# Patient Record
Sex: MALE | Race: WHITE | NOT HISPANIC OR LATINO | URBAN - METROPOLITAN AREA
[De-identification: names, ages, dates, MRNs, and addresses within clinical notes are randomized per-mention and may not be internally consistent; named-entity substitution may affect disease eponyms.]

---

## 2020-07-25 ENCOUNTER — TELEPHONE ENCOUNTER (OUTPATIENT)
Dept: URBAN - METROPOLITAN AREA CLINIC 13 | Facility: CLINIC | Age: 45
End: 2020-07-25

## 2020-07-25 RX ORDER — CETIRIZINE HYDROCHLORIDE 10 MG/1
TAKE 1 TABLET EVERY MORNING AS NEEDED TABLET, FILM COATED ORAL
Refills: 0 | OUTPATIENT
Start: 2009-02-02 | End: 2012-02-16

## 2020-07-26 ENCOUNTER — TELEPHONE ENCOUNTER (OUTPATIENT)
Dept: URBAN - METROPOLITAN AREA CLINIC 13 | Facility: CLINIC | Age: 45
End: 2020-07-26

## 2020-07-26 RX ORDER — MELOXICAM 15 MG/1
TABLET ORAL
Qty: 30 | Refills: 0 | Status: ACTIVE | COMMUNITY
Start: 2011-05-12

## 2020-07-26 RX ORDER — ALBUTEROL SULFATE 90 UG/1
AEROSOL, METERED RESPIRATORY (INHALATION)
Qty: 8 | Refills: 0 | Status: ACTIVE | COMMUNITY
Start: 2011-04-29

## 2020-07-26 RX ORDER — AZITHROMYCIN DIHYDRATE 250 MG/1
TABLET, FILM COATED ORAL
Qty: 6 | Refills: 0 | Status: ACTIVE | COMMUNITY
Start: 2011-03-28

## 2020-07-26 RX ORDER — AMOXICILLIN 875 MG/1
TABLET, FILM COATED ORAL
Qty: 20 | Refills: 0 | Status: ACTIVE | COMMUNITY
Start: 2011-06-04

## 2020-07-26 RX ORDER — HYDROCHLOROTHIAZIDE 12.5 MG/1
TABLET ORAL
Qty: 30 | Refills: 0 | Status: ACTIVE | COMMUNITY
Start: 2011-06-24

## 2023-01-13 ENCOUNTER — LAB VISIT (OUTPATIENT)
Dept: LAB | Facility: HOSPITAL | Age: 48
End: 2023-01-13
Payer: COMMERCIAL

## 2023-01-13 ENCOUNTER — OFFICE VISIT (OUTPATIENT)
Dept: PRIMARY CARE CLINIC | Facility: CLINIC | Age: 48
End: 2023-01-13
Payer: COMMERCIAL

## 2023-01-13 VITALS
BODY MASS INDEX: 30.27 KG/M2 | HEART RATE: 66 BPM | OXYGEN SATURATION: 96 % | WEIGHT: 181.69 LBS | TEMPERATURE: 98 F | SYSTOLIC BLOOD PRESSURE: 120 MMHG | RESPIRATION RATE: 15 BRPM | DIASTOLIC BLOOD PRESSURE: 80 MMHG | HEIGHT: 65 IN

## 2023-01-13 DIAGNOSIS — Z12.5 ENCOUNTER FOR SCREENING FOR MALIGNANT NEOPLASM OF PROSTATE: ICD-10-CM

## 2023-01-13 DIAGNOSIS — Z76.89 ENCOUNTER TO ESTABLISH CARE: ICD-10-CM

## 2023-01-13 DIAGNOSIS — J00 ACUTE RHINITIS: ICD-10-CM

## 2023-01-13 DIAGNOSIS — I10 PRIMARY HYPERTENSION: ICD-10-CM

## 2023-01-13 DIAGNOSIS — R79.89 LOW TESTOSTERONE IN MALE: ICD-10-CM

## 2023-01-13 DIAGNOSIS — Z12.11 COLON CANCER SCREENING: ICD-10-CM

## 2023-01-13 DIAGNOSIS — Z11.4 ENCOUNTER FOR SCREENING FOR HIV: ICD-10-CM

## 2023-01-13 DIAGNOSIS — Z76.89 ENCOUNTER TO ESTABLISH CARE: Primary | ICD-10-CM

## 2023-01-13 DIAGNOSIS — M79.673 PAIN OF FOOT, UNSPECIFIED LATERALITY: ICD-10-CM

## 2023-01-13 DIAGNOSIS — Z11.59 ENCOUNTER FOR HCV SCREENING TEST FOR LOW RISK PATIENT: ICD-10-CM

## 2023-01-13 DIAGNOSIS — F90.9 ATTENTION DEFICIT HYPERACTIVITY DISORDER (ADHD), UNSPECIFIED ADHD TYPE: ICD-10-CM

## 2023-01-13 DIAGNOSIS — J45.41 MODERATE PERSISTENT ASTHMA WITH ACUTE EXACERBATION: ICD-10-CM

## 2023-01-13 PROCEDURE — 1159F PR MEDICATION LIST DOCUMENTED IN MEDICAL RECORD: ICD-10-PCS | Mod: CPTII,S$GLB,, | Performed by: FAMILY MEDICINE

## 2023-01-13 PROCEDURE — 96372 PR INJECTION,THERAP/PROPH/DIAG2ST, IM OR SUBCUT: ICD-10-PCS | Mod: S$GLB,,, | Performed by: FAMILY MEDICINE

## 2023-01-13 PROCEDURE — 86803 HEPATITIS C AB TEST: CPT | Performed by: FAMILY MEDICINE

## 2023-01-13 PROCEDURE — 80061 LIPID PANEL: CPT | Performed by: FAMILY MEDICINE

## 2023-01-13 PROCEDURE — 99205 PR OFFICE/OUTPT VISIT, NEW, LEVL V, 60-74 MIN: ICD-10-PCS | Mod: 25,S$GLB,, | Performed by: FAMILY MEDICINE

## 2023-01-13 PROCEDURE — 99205 OFFICE O/P NEW HI 60 MIN: CPT | Mod: 25,S$GLB,, | Performed by: FAMILY MEDICINE

## 2023-01-13 PROCEDURE — 1159F MED LIST DOCD IN RCRD: CPT | Mod: CPTII,S$GLB,, | Performed by: FAMILY MEDICINE

## 2023-01-13 PROCEDURE — 3074F PR MOST RECENT SYSTOLIC BLOOD PRESSURE < 130 MM HG: ICD-10-PCS | Mod: CPTII,S$GLB,, | Performed by: FAMILY MEDICINE

## 2023-01-13 PROCEDURE — 85025 COMPLETE CBC W/AUTO DIFF WBC: CPT | Performed by: FAMILY MEDICINE

## 2023-01-13 PROCEDURE — 3079F PR MOST RECENT DIASTOLIC BLOOD PRESSURE 80-89 MM HG: ICD-10-PCS | Mod: CPTII,S$GLB,, | Performed by: FAMILY MEDICINE

## 2023-01-13 PROCEDURE — 1160F PR REVIEW ALL MEDS BY PRESCRIBER/CLIN PHARMACIST DOCUMENTED: ICD-10-PCS | Mod: CPTII,S$GLB,, | Performed by: FAMILY MEDICINE

## 2023-01-13 PROCEDURE — 84443 ASSAY THYROID STIM HORMONE: CPT | Performed by: FAMILY MEDICINE

## 2023-01-13 PROCEDURE — 84153 ASSAY OF PSA TOTAL: CPT | Performed by: FAMILY MEDICINE

## 2023-01-13 PROCEDURE — 87389 HIV-1 AG W/HIV-1&-2 AB AG IA: CPT | Performed by: FAMILY MEDICINE

## 2023-01-13 PROCEDURE — 99999 PR PBB SHADOW E&M-NEW PATIENT-LVL V: ICD-10-PCS | Mod: PBBFAC,,, | Performed by: FAMILY MEDICINE

## 2023-01-13 PROCEDURE — 96372 THER/PROPH/DIAG INJ SC/IM: CPT | Mod: S$GLB,,, | Performed by: FAMILY MEDICINE

## 2023-01-13 PROCEDURE — 3008F PR BODY MASS INDEX (BMI) DOCUMENTED: ICD-10-PCS | Mod: CPTII,S$GLB,, | Performed by: FAMILY MEDICINE

## 2023-01-13 PROCEDURE — 1160F RVW MEDS BY RX/DR IN RCRD: CPT | Mod: CPTII,S$GLB,, | Performed by: FAMILY MEDICINE

## 2023-01-13 PROCEDURE — 3008F BODY MASS INDEX DOCD: CPT | Mod: CPTII,S$GLB,, | Performed by: FAMILY MEDICINE

## 2023-01-13 PROCEDURE — 99999 PR PBB SHADOW E&M-NEW PATIENT-LVL V: CPT | Mod: PBBFAC,,, | Performed by: FAMILY MEDICINE

## 2023-01-13 PROCEDURE — 36415 COLL VENOUS BLD VENIPUNCTURE: CPT | Mod: PN | Performed by: FAMILY MEDICINE

## 2023-01-13 PROCEDURE — 3079F DIAST BP 80-89 MM HG: CPT | Mod: CPTII,S$GLB,, | Performed by: FAMILY MEDICINE

## 2023-01-13 PROCEDURE — 3074F SYST BP LT 130 MM HG: CPT | Mod: CPTII,S$GLB,, | Performed by: FAMILY MEDICINE

## 2023-01-13 PROCEDURE — 80053 COMPREHEN METABOLIC PANEL: CPT | Performed by: FAMILY MEDICINE

## 2023-01-13 RX ORDER — PREDNISONE 20 MG/1
40 TABLET ORAL DAILY
Qty: 10 TABLET | Refills: 0 | Status: SHIPPED | OUTPATIENT
Start: 2023-01-13 | End: 2023-01-18

## 2023-01-13 RX ORDER — BETAMETHASONE SODIUM PHOSPHATE AND BETAMETHASONE ACETATE 3; 3 MG/ML; MG/ML
9 INJECTION, SUSPENSION INTRA-ARTICULAR; INTRALESIONAL; INTRAMUSCULAR; SOFT TISSUE
Status: COMPLETED | OUTPATIENT
Start: 2023-01-13 | End: 2023-01-13

## 2023-01-13 RX ORDER — LOSARTAN POTASSIUM AND HYDROCHLOROTHIAZIDE 25; 100 MG/1; MG/1
1 TABLET ORAL DAILY
Qty: 90 TABLET | Refills: 3 | Status: SHIPPED | OUTPATIENT
Start: 2023-01-13 | End: 2024-01-13

## 2023-01-13 RX ORDER — TESTOSTERONE ENANTHATE 75 MG/.5ML
INJECTION SUBCUTANEOUS
COMMUNITY
End: 2023-01-13 | Stop reason: SDUPTHER

## 2023-01-13 RX ORDER — OMEPRAZOLE 40 MG/1
CAPSULE, DELAYED RELEASE ORAL
COMMUNITY
End: 2023-01-13 | Stop reason: ALTCHOICE

## 2023-01-13 RX ORDER — DEXTROAMPHETAMINE SACCHARATE, AMPHETAMINE ASPARTATE, DEXTROAMPHETAMINE SULFATE AND AMPHETAMINE SULFATE 2.5; 2.5; 2.5; 2.5 MG/1; MG/1; MG/1; MG/1
TABLET ORAL
COMMUNITY
End: 2023-02-15 | Stop reason: SDUPTHER

## 2023-01-13 RX ORDER — LOSARTAN POTASSIUM 100 MG/1
TABLET ORAL
COMMUNITY
End: 2023-01-13 | Stop reason: ALTCHOICE

## 2023-01-13 RX ORDER — MECLIZINE HYDROCHLORIDE 25 MG/1
TABLET ORAL
COMMUNITY
End: 2023-01-13 | Stop reason: ALTCHOICE

## 2023-01-13 RX ORDER — TESTOSTERONE ENANTHATE 75 MG/.5ML
75 INJECTION SUBCUTANEOUS WEEKLY
Qty: 3 ML | Refills: 0 | Status: SHIPPED | OUTPATIENT
Start: 2023-01-13 | End: 2023-01-31 | Stop reason: SDUPTHER

## 2023-01-13 RX ORDER — ALBUTEROL SULFATE 0.83 MG/ML
SOLUTION RESPIRATORY (INHALATION)
COMMUNITY
End: 2023-01-13 | Stop reason: SDUPTHER

## 2023-01-13 RX ORDER — DUPILUMAB 300 MG/2ML
2 INJECTION, SOLUTION SUBCUTANEOUS
COMMUNITY
End: 2023-01-13 | Stop reason: SDUPTHER

## 2023-01-13 RX ORDER — DUPILUMAB 300 MG/2ML
300 INJECTION, SOLUTION SUBCUTANEOUS
Qty: 12 ML | Refills: 3 | Status: SHIPPED | OUTPATIENT
Start: 2023-01-13 | End: 2023-02-07 | Stop reason: SDUPTHER

## 2023-01-13 RX ORDER — HYDROCHLOROTHIAZIDE 25 MG/1
TABLET ORAL
COMMUNITY
End: 2023-01-13 | Stop reason: ALTCHOICE

## 2023-01-13 RX ORDER — ALBUTEROL SULFATE 90 UG/1
2 AEROSOL, METERED RESPIRATORY (INHALATION) EVERY 6 HOURS PRN
Qty: 18 G | Refills: 3 | Status: SHIPPED | OUTPATIENT
Start: 2023-01-13

## 2023-01-13 RX ORDER — ALBUTEROL SULFATE 90 UG/1
AEROSOL, METERED RESPIRATORY (INHALATION)
COMMUNITY
End: 2023-01-13 | Stop reason: SDUPTHER

## 2023-01-13 RX ADMIN — BETAMETHASONE SODIUM PHOSPHATE AND BETAMETHASONE ACETATE 9 MG: 3; 3 INJECTION, SUSPENSION INTRA-ARTICULAR; INTRALESIONAL; INTRAMUSCULAR; SOFT TISSUE at 12:01

## 2023-01-13 NOTE — PROGRESS NOTES
After obtaining consent, per orders from Dr Ortiz, celestone 9mg injection given by myself. Pt instructed to wait 15 min afterwards and to report any adverse reaction/feelings immediately.    Pt declined to wait stating he has had 'numerous steroid injections' in the past and he had to get back to work.

## 2023-01-13 NOTE — PATIENT INSTRUCTIONS
Physically, everything looks pretty good today.      Screening labs done today.  [facility] will [facility] as soon as they are available.      Most of your medication refills have been sent to the pharmacy today.  Please continue to take them, as directed.      One month supply of testosterone sent to pharmacy today.  Our clinic does not typically maintained testosterone treatments.  We defer that to our urologist.  Referral placed to Urology today.  We have a urologist that comes here, to this location, three days per week.      Your blood pressure medications, losartan and hydrochlorothiazide have been combined into a single tablet.  This should make it easier to continue taking them.      Asthma medications have been refilled.  Continue taking them, as directed.  Would like you to check in with one of our pulmonologists, as well.  Referral placed today.    For the ADHD medications, we will need to get verification of the diagnosis from your previous provider.  We will contact them today.  As soon as we get the records, we can send the prescription to the pharmacy.    For your allergy flare up, let us do Preeti on injection today.  Start prednisone tablets tomorrow.  Continue taking OTC antihistamine, such as Claritin or Zyrtec.  Down the road, we can always add Singulair at bedtime to see if that helps both with your allergies and with your asthma.    We are due for colon cancer screening.  Referral placed to the endoscopy  today.  They should contact you to arrange a day and time that is convenient.  If for some reason it does not work with your schedule, please let me know.  We can always do the Cologuard test instead.    Referral also placed for Dr. Kenya Bonilla, orthopedic foot specialist, with [facility].  Feel free to call her office at [phone] to schedule an appointment.    Continue to eat a healthy diet.  Be careful with portion sizes.  Includes lots of fresh fruits,  vegetables, whole grains, lean proteins.  See info below.    Keep hydrated.  Be sure to drink at least 8-10, 8 oz, glasses of water every day.    Stay active.  Try to do some sort of physical activity every day.  Nothing outrageous, just try walking for 10-15 minutes each day.

## 2023-01-13 NOTE — PROGRESS NOTES
"    [facility]       [address] [place] [address]      Phone: [phone]      Fax: [phone]    Slim Ortiz MD                Office Visit  01/13/2023        Subjective      HPI:  Fernando Hoff is a 48 y.o. male presents today in clinic for "Establish Care  ."     48-year-old gentleman presents today to [facility] care, checkup.      Patient states he just moved here from [place].  Needs medication refills.      States that he feels generally okay today.  Has been having some postnasal drip, coughing, sneezing lately.  Throat feels irritated.  No fever, chills.  Has been using OTC Claritin, Benadryl, Chloraseptic.    Also reports that over the past few days, around mid day, his hands feel like there cramping up.  Thinks he might be a little dehydrated.  Has been eating bananas to see if that would help.      Otherwise, he denies any chest pain, shortness a breath.  States appetite is normal.  States bowel movements are normal.  No urinary issues.      Has hypertension.  Has been taking losartan 100 mg, HCTZ 25 mg daily.  Blood pressure generally well controlled on these medications.      Also has moderate persistent asthma.  Currently on Dupixent injections, 300 mg every two weeks.  Uses Trelegy inhaler daily.  Has albuterol inhaler to use as needed.  Has been using it a little more frequently lately.  Has needed it about two or 3 times in the last couple of weeks, mostly due to the above symptoms.    Also reports ADHD.  Went through full evaluation back in [place].  Has prescription for Adderall 10 mg, twice a day.  States he generally only takes it once a day.    Has been diagnosed with low testosterone in the past.  Takes Xyosted injections 75 mg weekly.    PMH:  HTN.  Asthma.  Allergies.  ADHD.  Low T.    PSH: Left shoulder x2.  Left ankle/foot.  Nasal turbinates.  Deviated septum.    F MH: CAD/CABG.  HTN.  Thyroid.  Unknown cancer.    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    T: Denies   A:  Occasionally  D: Denies    Exercise:  Goes to the gym daily.      Colon:  Had one in the past secondary to blood in his stools.      The following were updated and reviewed by myself in the chart: medications, past medical history, past surgical history, family history, social history, and allergies.     Medications:  Current Outpatient Medications on File Prior to Visit   Medication Sig Dispense Refill    dextroamphetamine-amphetamine 10 mg Tab dextroamphetamine-amphetamine 10 mg tablet   TAKE 1 TABLET BY MOUTH TWICE DAILY FOR 30 DAYS      [DISCONTINUED] albuterol (PROVENTIL) 2.5 mg /3 mL (0.083 %) nebulizer solution albuterol sulfate 2.5 mg/3 mL (0.083 %) solution for nebulization   USE 1 VIAL VIA NEBULIZER FOUR TIMES DAILY AS NEEDED FOR WHEEZING      [DISCONTINUED] albuterol (PROVENTIL/VENTOLIN HFA) 90 mcg/actuation inhaler albuterol sulfate HFA 90 mcg/actuation aerosol inhaler   Inhale 2 puffs every 4 hours by inhalation route as needed for 30 days.      [DISCONTINUED] dupilumab (DUPIXENT SYRINGE) 300 mg/2 mL Syrg 2 mLs.      [DISCONTINUED] fluticasone-umeclidin-vilanter (TRELEGY ELLIPTA) 100-62.5-25 mcg DsDv Trelegy Ellipta 100 mcg-62.5 mcg-25 mcg powder for inhalation   Inhale 1 puff every day by inhalation route.      [DISCONTINUED] hydroCHLOROthiazide (HYDRODIURIL) 25 MG tablet hydrochlorothiazide 25 mg tablet   TAKE 1 TABLET BY MOUTH DAILY      [DISCONTINUED] losartan (COZAAR) 100 MG tablet losartan 100 mg tablet   TAKE 1 TABLET BY MOUTH EVERY DAY      [DISCONTINUED] meclizine (ANTIVERT) 25 mg tablet meclizine 25 mg tablet   TK 1 T PO  BID PRF VERTIGO      [DISCONTINUED] omeprazole (PRILOSEC) 40 MG capsule omeprazole 40 mg capsule,delayed release   TAKE 1 CAPSULE BY MOUTH EVERY DAY      [DISCONTINUED] testosterone enanthate (XYOSTED) 75 mg/0.5 mL AtIn Xyosted 75 mg/0.5 mL subcutaneous auto-injector   INJECT 1 PEN UNDER  "THE SKIN ONCE A WEEK       No current facility-administered medications on file prior to visit.        PMHx:  Past Medical History:   Diagnosis Date    Hypertension       There are no problems to display for this patient.       PSHx:  Past Surgical History:   Procedure Laterality Date    ANKLE SURGERY Left 2020 2021    FOOT SURGERY Left 2020 2021    NASAL SEPTUM SURGERY  2012    SHOULDER SURGERY Right 2018        FHx:  Family History   Problem Relation Age of Onset    Hypertension Mother     Thyroid disease Mother     Heart disease Father     Hypertension Father     Allergies Father         Social:  Social History     Socioeconomic History    Marital status: Single   Tobacco Use    Smoking status: Never    Smokeless tobacco: Never   Substance and Sexual Activity    Alcohol use: Yes     Comment: on ooccasion    Drug use: Never    Sexual activity: Yes     Partners: Female     Birth control/protection: OCP        Allergies:  Review of patient's allergies indicates:  No Known Allergies     ROS:  Review of Systems   Constitutional:  Negative for activity change, appetite change, chills and fever.   HENT:  Positive for congestion, postnasal drip, rhinorrhea, sneezing and sore throat. Negative for trouble swallowing.    Respiratory:  Negative for cough and shortness of breath.    Cardiovascular:  Negative for chest pain and palpitations.   Gastrointestinal:  Negative for abdominal pain, constipation, diarrhea, nausea and vomiting.   Genitourinary:  Negative for difficulty urinating.   Musculoskeletal:  Negative for arthralgias and myalgias.   Skin:  Negative for color change and rash.   Neurological:  Negative for headaches.   All other systems reviewed and are negative.       Objective      /80   Pulse 66   Temp 98.4 °F (36.9 °C) (Temporal)   Resp 15   Ht 5' 5" (1.651 m)   Wt 82.4 kg (181 lb 10.5 oz)   SpO2 96%   BMI 30.23 kg/m²   Ht Readings from Last 3 Encounters:   01/13/23 5' 5" (1.651 m)     Wt " Readings from Last 3 Encounters:   01/13/23 82.4 kg (181 lb 10.5 oz)       PHYSICAL EXAM:  Physical Exam  Vitals and nursing note reviewed.   Constitutional:       General: He is not in acute distress.     Appearance: Normal appearance.   HENT:      Head: Normocephalic and atraumatic.      Right Ear: Tympanic membrane, ear canal and external ear normal.      Left Ear: Tympanic membrane, ear canal and external ear normal.      Nose: Congestion present. No rhinorrhea.      Mouth/Throat:      Mouth: Mucous membranes are moist.      Pharynx: Oropharynx is clear. No oropharyngeal exudate or posterior oropharyngeal erythema.   Eyes:      Extraocular Movements: Extraocular movements intact.      Conjunctiva/sclera: Conjunctivae normal.      Pupils: Pupils are equal, round, and reactive to light.   Cardiovascular:      Rate and Rhythm: Normal rate and regular rhythm.   Pulmonary:      Effort: Pulmonary effort is normal.      Breath sounds: No wheezing, rhonchi or rales.   Musculoskeletal:         General: Normal range of motion.      Cervical back: Normal range of motion.   Lymphadenopathy:      Cervical: No cervical adenopathy.   Skin:     General: Skin is warm and dry.   Neurological:      General: No focal deficit present.      Mental Status: He is alert.            LABS / IMAGING:  No results found for this or any previous visit (from the past 4368 hour(s)).      Assessment    1. Encounter to establish care    2. Primary hypertension    3. Mild persistent asthma without complication    4. Acute rhinitis    5. Low testosterone in male    6. Attention deficit hyperactivity disorder (ADHD), unspecified ADHD type    7. Colon cancer screening    8. Encounter for HCV screening test for low risk patient    9. Encounter for screening for HIV    10. Encounter for screening for malignant neoplasm of prostate    11. Pain of foot, unspecified laterality          Plan    Fernando was seen today for establish care.    Diagnoses and all  orders for this visit:    Encounter to establish care  -     CBC Auto Differential; Future  -     Comprehensive Metabolic Panel; Future  -     TSH; Future  -     Lipid Panel; Future  -     PSA, Screening; Future  -     HIV 1/2 Ag/Ab (4th Gen); Future  -     Hepatitis C Antibody; Future    Primary hypertension  -     CBC Auto Differential; Future  -     Comprehensive Metabolic Panel; Future  -     TSH; Future  -     Lipid Panel; Future  -     losartan-hydrochlorothiazide 100-25 mg (HYZAAR) 100-25 mg per tablet; Take 1 tablet by mouth once daily.    Mild persistent asthma without complication  -     albuterol (VENTOLIN HFA) 90 mcg/actuation inhaler; Inhale 2 puffs into the lungs every 6 (six) hours as needed for Wheezing or Shortness of Breath. Rescue inhaler.  -     dupilumab (DUPIXENT SYRINGE) 300 mg/2 mL Syrg; Inject 2 mLs (300 mg total) into the skin every 14 (fourteen) days.  -     fluticasone-umeclidin-vilanter (TRELEGY ELLIPTA) 100-62.5-25 mcg DsDv; Inhale 1 puff into the lungs once daily.  -     predniSONE (DELTASONE) 20 MG tablet; Take 2 tablets (40 mg total) by mouth once daily. for 5 days  -     betamethasone acetate-betamethasone sodium phosphate injection 9 mg  -     Ambulatory referral/consult to Pulmonology; Future    Acute rhinitis  -     predniSONE (DELTASONE) 20 MG tablet; Take 2 tablets (40 mg total) by mouth once daily. for 5 days  -     betamethasone acetate-betamethasone sodium phosphate injection 9 mg    Low testosterone in male  -     testosterone enanthate (XYOSTED) 75 mg/0.5 mL AtIn; Inject 75 mg into the skin once a week.  -     Ambulatory referral/consult to Urology; Future    Attention deficit hyperactivity disorder (ADHD), unspecified ADHD type    Colon cancer screening  -     Ambulatory referral/consult to Endo Procedure ; Future    Encounter for HCV screening test for low risk patient  -     Hepatitis C Antibody; Future    Encounter for screening for HIV  -     HIV 1/2 Ag/Ab  (4th Gen); Future    Encounter for screening for malignant neoplasm of prostate  -     PSA, Screening; Future    Pain of foot, unspecified laterality  -     Ambulatory referral/consult to Orthopedics; Future    Physically, everything looks pretty good today.  Has some mild congestion, likely from allergies.  This 1st may be causing his asthma to flare.  Will give Preeti on injection today, prednisone tablets.  Use OTC antihistamines.      Screening labs, as above.      Due for screening colonoscopy.  Referral placed today.    Med refills given today.      Referral to Urology to continue testosterone replacement.      Will send record request to his previous PCP, Dr. Zain Sharma at [facility] in [place], [phone].  Attempted to call their office this afternoon, but Dr. Sharma is not in clinic today.    Will also place referral to pulmonology to monitor Dupixent injections. (May need to send to allergy?)    He requested referral to orthopedic foot specialist for follow-up of ongoing foot issues.  [facility]        FOLLOW-UP:  Follow up in about 3 months (around 4/13/2023) for med refill.    I spent a total of 65 minutes face to face and non-face to face on the date of this visit.This includes time preparing to see the patient (eg, review of tests, notes), obtaining and/or reviewing additional history from an independent historian and/or outside medical records, documenting clinical information in the electronic health record, independently interpreting results and/or communicating results to the patient/family/caregiver, or care coordinator.    Signed by:  Slim Ortiz MD

## 2023-01-14 LAB
ALBUMIN SERPL BCP-MCNC: 3.8 G/DL (ref 3.5–5.2)
ALP SERPL-CCNC: 84 U/L (ref 55–135)
ALT SERPL W/O P-5'-P-CCNC: 41 U/L (ref 10–44)
ANION GAP SERPL CALC-SCNC: 13 MMOL/L (ref 8–16)
AST SERPL-CCNC: 31 U/L (ref 10–40)
BASOPHILS # BLD AUTO: 0.06 K/UL (ref 0–0.2)
BASOPHILS NFR BLD: 0.7 % (ref 0–1.9)
BILIRUB SERPL-MCNC: 1 MG/DL (ref 0.1–1)
BUN SERPL-MCNC: 26 MG/DL (ref 6–20)
CALCIUM SERPL-MCNC: 9.3 MG/DL (ref 8.7–10.5)
CHLORIDE SERPL-SCNC: 100 MMOL/L (ref 95–110)
CHOLEST SERPL-MCNC: 199 MG/DL (ref 120–199)
CHOLEST/HDLC SERPL: 2.6 {RATIO} (ref 2–5)
CO2 SERPL-SCNC: 24 MMOL/L (ref 23–29)
COMPLEXED PSA SERPL-MCNC: 0.35 NG/ML (ref 0–4)
CREAT SERPL-MCNC: 0.9 MG/DL (ref 0.5–1.4)
DIFFERENTIAL METHOD: ABNORMAL
EOSINOPHIL # BLD AUTO: 0.1 K/UL (ref 0–0.5)
EOSINOPHIL NFR BLD: 0.8 % (ref 0–8)
ERYTHROCYTE [DISTWIDTH] IN BLOOD BY AUTOMATED COUNT: 13.5 % (ref 11.5–14.5)
EST. GFR  (NO RACE VARIABLE): >60 ML/MIN/1.73 M^2
GLUCOSE SERPL-MCNC: 66 MG/DL (ref 70–110)
HCT VFR BLD AUTO: 47 % (ref 40–54)
HCV AB SERPL QL IA: NORMAL
HDLC SERPL-MCNC: 78 MG/DL (ref 40–75)
HDLC SERPL: 39.2 % (ref 20–50)
HGB BLD-MCNC: 15.3 G/DL (ref 14–18)
HIV 1+2 AB+HIV1 P24 AG SERPL QL IA: NORMAL
IMM GRANULOCYTES # BLD AUTO: 0.17 K/UL (ref 0–0.04)
IMM GRANULOCYTES NFR BLD AUTO: 2 % (ref 0–0.5)
LDLC SERPL CALC-MCNC: 104.2 MG/DL (ref 63–159)
LYMPHOCYTES # BLD AUTO: 1.7 K/UL (ref 1–4.8)
LYMPHOCYTES NFR BLD: 18.9 % (ref 18–48)
MCH RBC QN AUTO: 30.6 PG (ref 27–31)
MCHC RBC AUTO-ENTMCNC: 32.6 G/DL (ref 32–36)
MCV RBC AUTO: 94 FL (ref 82–98)
MONOCYTES # BLD AUTO: 0.7 K/UL (ref 0.3–1)
MONOCYTES NFR BLD: 8 % (ref 4–15)
NEUTROPHILS # BLD AUTO: 6.1 K/UL (ref 1.8–7.7)
NEUTROPHILS NFR BLD: 69.6 % (ref 38–73)
NONHDLC SERPL-MCNC: 121 MG/DL
NRBC BLD-RTO: 0 /100 WBC
PLATELET # BLD AUTO: 406 K/UL (ref 150–450)
PMV BLD AUTO: 9.6 FL (ref 9.2–12.9)
POTASSIUM SERPL-SCNC: 3.5 MMOL/L (ref 3.5–5.1)
PROT SERPL-MCNC: 7.4 G/DL (ref 6–8.4)
RBC # BLD AUTO: 5 M/UL (ref 4.6–6.2)
SODIUM SERPL-SCNC: 137 MMOL/L (ref 136–145)
TRIGL SERPL-MCNC: 84 MG/DL (ref 30–150)
TSH SERPL DL<=0.005 MIU/L-ACNC: 1.59 UIU/ML (ref 0.4–4)
WBC # BLD AUTO: 8.71 K/UL (ref 3.9–12.7)

## 2023-01-14 NOTE — PROGRESS NOTES
** please call and mail **    Mr. King,     Happy belated birthday!     You should be able to see the results of your recent blood work in [facility].  Everything looks good.      Blood counts all look fine.  Electrolytes, kidney function, liver function, and thyroid function are all okay.      You were negative for HIV and hepatitis-C.  Your PSA level was in the normal range.  Cholesterol numbers look good.      Please let us know if you have any questions!

## 2023-01-31 ENCOUNTER — LAB VISIT (OUTPATIENT)
Dept: LAB | Facility: HOSPITAL | Age: 48
End: 2023-01-31
Attending: UROLOGY
Payer: COMMERCIAL

## 2023-01-31 ENCOUNTER — OFFICE VISIT (OUTPATIENT)
Dept: UROLOGY | Facility: CLINIC | Age: 48
End: 2023-01-31
Payer: COMMERCIAL

## 2023-01-31 VITALS
SYSTOLIC BLOOD PRESSURE: 127 MMHG | HEIGHT: 65 IN | DIASTOLIC BLOOD PRESSURE: 79 MMHG | BODY MASS INDEX: 31.59 KG/M2 | WEIGHT: 189.63 LBS | HEART RATE: 73 BPM

## 2023-01-31 DIAGNOSIS — R79.89 LOW TESTOSTERONE IN MALE: ICD-10-CM

## 2023-01-31 PROCEDURE — 3008F BODY MASS INDEX DOCD: CPT | Mod: CPTII,S$GLB,, | Performed by: UROLOGY

## 2023-01-31 PROCEDURE — 3078F PR MOST RECENT DIASTOLIC BLOOD PRESSURE < 80 MM HG: ICD-10-PCS | Mod: CPTII,S$GLB,, | Performed by: UROLOGY

## 2023-01-31 PROCEDURE — 3078F DIAST BP <80 MM HG: CPT | Mod: CPTII,S$GLB,, | Performed by: UROLOGY

## 2023-01-31 PROCEDURE — 1159F MED LIST DOCD IN RCRD: CPT | Mod: CPTII,S$GLB,, | Performed by: UROLOGY

## 2023-01-31 PROCEDURE — 99999 PR PBB SHADOW E&M-EST. PATIENT-LVL IV: CPT | Mod: PBBFAC,,, | Performed by: UROLOGY

## 2023-01-31 PROCEDURE — 99999 PR PBB SHADOW E&M-EST. PATIENT-LVL IV: ICD-10-PCS | Mod: PBBFAC,,, | Performed by: UROLOGY

## 2023-01-31 PROCEDURE — 99204 OFFICE O/P NEW MOD 45 MIN: CPT | Mod: S$GLB,,, | Performed by: UROLOGY

## 2023-01-31 PROCEDURE — 84403 ASSAY OF TOTAL TESTOSTERONE: CPT | Performed by: UROLOGY

## 2023-01-31 PROCEDURE — 36415 COLL VENOUS BLD VENIPUNCTURE: CPT | Mod: PN | Performed by: UROLOGY

## 2023-01-31 PROCEDURE — 3008F PR BODY MASS INDEX (BMI) DOCUMENTED: ICD-10-PCS | Mod: CPTII,S$GLB,, | Performed by: UROLOGY

## 2023-01-31 PROCEDURE — 3074F SYST BP LT 130 MM HG: CPT | Mod: CPTII,S$GLB,, | Performed by: UROLOGY

## 2023-01-31 PROCEDURE — 1159F PR MEDICATION LIST DOCUMENTED IN MEDICAL RECORD: ICD-10-PCS | Mod: CPTII,S$GLB,, | Performed by: UROLOGY

## 2023-01-31 PROCEDURE — 3074F PR MOST RECENT SYSTOLIC BLOOD PRESSURE < 130 MM HG: ICD-10-PCS | Mod: CPTII,S$GLB,, | Performed by: UROLOGY

## 2023-01-31 PROCEDURE — 99204 PR OFFICE/OUTPT VISIT, NEW, LEVL IV, 45-59 MIN: ICD-10-PCS | Mod: S$GLB,,, | Performed by: UROLOGY

## 2023-01-31 RX ORDER — TESTOSTERONE ENANTHATE 75 MG/.5ML
75 INJECTION SUBCUTANEOUS WEEKLY
Qty: 4 EACH | Refills: 5 | Status: SHIPPED | OUTPATIENT
Start: 2023-01-31 | End: 2023-08-08

## 2023-01-31 NOTE — PROGRESS NOTES
Chief Complaint   Patient presents with    Low Testosterone       Referring Provider: Dr. Slim Ortiz     History of Present Illness:   Fernando Hoff is a 48 y.o. male here for evaluation of Low Testosterone    [birth year]-49yo male, here for evaluation of low T. He reports that he has been Xyosted 75mg weekly for about 2 years. He recently moved from [place]. Prior to that, was on IM testosterone and he had too many peaks and valleys in his levels. Prior to testosterone replacement, he had low energy and low energy. Xyosted has seemed to improve symptoms. No bothersome LUTS.       Review of Systems   Constitutional:  Negative for chills and fever.   Respiratory:  Negative for shortness of breath.    Cardiovascular:  Negative for chest pain and palpitations.   Gastrointestinal:  Negative for abdominal pain.   Genitourinary:  Negative for difficulty urinating.   All other systems reviewed and are negative.      Past Medical History:   Diagnosis Date    Hypertension     Low testosterone        Past Surgical History:   Procedure Laterality Date    ANKLE SURGERY Left 2020    2021    FOOT SURGERY Left 2020    2021    NASAL SEPTUM SURGERY  2012    SHOULDER SURGERY Right 2018       Family History   Problem Relation Age of Onset    Hypertension Mother     Thyroid disease Mother     Heart disease Father     Hypertension Father     Allergies Father        Social History     Tobacco Use    Smoking status: Never    Smokeless tobacco: Never   Substance Use Topics    Alcohol use: Yes     Comment: on ooccasion    Drug use: Never       Current Outpatient Medications   Medication Sig Dispense Refill    albuterol (VENTOLIN HFA) 90 mcg/actuation inhaler Inhale 2 puffs into the lungs every 6 (six) hours as needed for Wheezing or Shortness of Breath. Rescue inhaler. 18 g 3    dupilumab (DUPIXENT SYRINGE) 300 mg/2 mL Syrg Inject 2 mLs (300 mg total) into the skin every 14 (fourteen) days. 12 mL 3    fluticasone-umeclidin-vilanter  (TRELEGY ELLIPTA) 100-62.5-25 mcg DsDv Inhale 1 puff into the lungs once daily. 60 each 5    losartan-hydrochlorothiazide 100-25 mg (HYZAAR) 100-25 mg per tablet Take 1 tablet by mouth once daily. 90 tablet 3    dextroamphetamine-amphetamine 10 mg Tab dextroamphetamine-amphetamine 10 mg tablet   TAKE 1 TABLET BY MOUTH TWICE DAILY FOR 30 DAYS      testosterone enanthate (XYOSTED) 75 mg/0.5 mL AtIn Inject 75 mg into the skin once a week. 4 each 5     No current facility-administered medications for this visit.       Review of patient's allergies indicates:  No Known Allergies    Physical Exam  Vitals:    01/31/23 1311   BP: 127/79   Pulse: 73       General: Well-developed, well-nourished in no acute distress  HEENT: Normocephalic, atraumatic, Extraocular movements intact  Neck: supple, trachea midline, no cervical or supraclavicular lymphadenopathy  Respirations: even and unlabored  Back: midline spine, no CVA tenderness  Abdomen: soft, Non-tender, non-distended, no organomegaly or palpable masses, no rebound or guarding  : circumcised male phallus without lesions, orthotopic urethral meatus, no inguinal hernia, no inguinal lymphadenopathy, no scrotal lesions, testicles descended bilaterally without mass or tenderness  Extremities: atraumatic, moves all equally, no clubbing, cyanosis or edema  Psych: normal affect  Skin: warm and dry, no lesions  Neuro: Alert and oriented, Cranial nerves II-XII grossly intact      Lab Results   Component Value Date    PSA 0.35 01/13/2023         Assessment:   1. Low testosterone in male  Ambulatory referral/consult to Urology    Testosterone    testosterone enanthate (XYOSTED) 75 mg/0.5 mL AtIn    Comprehensive Metabolic Panel    CBC Without Differential    Prostate Specific Antigen, Diagnostic    Testosterone          Plan:  Low testosterone in male  -     Ambulatory referral/consult to Urology  -     Testosterone; Future; Expected date: 01/31/2023  -     testosterone enanthate  (XYOSTED) 75 mg/0.5 mL AtIn; Inject 75 mg into the skin once a week.  Dispense: 4 each; Refill: 5  -     Comprehensive Metabolic Panel; Future; Expected date: 06/30/2023  -     CBC Without Differential; Future; Expected date: 06/30/2023  -     Prostate Specific Antigen, Diagnostic; Future; Expected date: 06/30/2023  -     Testosterone; Future; Expected date: 06/30/2023        Follow up in about 6 months (around 7/31/2023) for labs before visit.

## 2023-02-01 LAB — TESTOST SERPL-MCNC: 306 NG/DL (ref 304–1227)

## 2023-02-02 DIAGNOSIS — J45.30 MILD PERSISTENT ASTHMA WITHOUT COMPLICATION: ICD-10-CM

## 2023-02-02 NOTE — TELEPHONE ENCOUNTER
----- Message from Michaelle Malik sent at 2/2/2023 11:40 AM CST -----  Contact: [facility]\Keira ANKUSH Crockett states that the pt is out of his medication dupilumab (DUPIXENT SYRINGE) 300 mg/2 mL Syrg and was looking for an authorization to have it filled at [facility]. Please send the authorization or call her back at [phone].       Thanks  TS

## 2023-02-06 ENCOUNTER — TELEPHONE (OUTPATIENT)
Dept: PRIMARY CARE CLINIC | Facility: CLINIC | Age: 48
End: 2023-02-06
Payer: COMMERCIAL

## 2023-02-06 DIAGNOSIS — J45.30 MILD PERSISTENT ASTHMA WITHOUT COMPLICATION: ICD-10-CM

## 2023-02-06 NOTE — TELEPHONE ENCOUNTER
----- Message from Linette Martinez sent at 2/6/2023  3:24 PM CST -----  Contact: [phone]  Pt is calling in regards to his dupilumab (DUPIXENT SYRINGE) 300 mg/2 mL Syrg. Pt stated that he is needing a prescription [facility] to [facility] [phone]. pt stated that he is out of medication. Please call pt back at [phone]. Thanks KB

## 2023-02-07 RX ORDER — DUPILUMAB 300 MG/2ML
300 INJECTION, SOLUTION SUBCUTANEOUS
Qty: 12 ML | Refills: 3 | Status: SHIPPED | OUTPATIENT
Start: 2023-02-07 | End: 2023-02-15 | Stop reason: SDUPTHER

## 2023-02-07 NOTE — TELEPHONE ENCOUNTER
----- Message from Shilpa White sent at 2/7/2023 11:43 AM CST -----  Contact: Fernando  Patient is calling to speak with a nurse regarding prescription. Patient reports having issues getting dupilumab (DUPIXENT SYRINGE) 300 mg/2 mL Syrgprescription refilled, through the pharmacy below, and request assistance. Please give patient a call back at [phone] when possible.     [facility] ([facility] - [place], [place] - [address]  [address]  [facility] [address]  [facility] [address]  Phone: [phone] Fax: [phone]    Thank you,  GH

## 2023-02-07 NOTE — TELEPHONE ENCOUNTER
Pt reports he was advised he cannot get the Dupixent from [facility] as his insurance will not cover it from them.    Please send pt's medication to [facility] as he is currently out.

## 2023-02-15 DIAGNOSIS — J45.30 MILD PERSISTENT ASTHMA WITHOUT COMPLICATION: ICD-10-CM

## 2023-02-15 DIAGNOSIS — J45.41 MODERATE PERSISTENT ASTHMA WITH ACUTE EXACERBATION: Primary | ICD-10-CM

## 2023-02-15 DIAGNOSIS — F90.9 ATTENTION DEFICIT HYPERACTIVITY DISORDER (ADHD), UNSPECIFIED ADHD TYPE: ICD-10-CM

## 2023-02-15 RX ORDER — DEXTROAMPHETAMINE SACCHARATE, AMPHETAMINE ASPARTATE, DEXTROAMPHETAMINE SULFATE AND AMPHETAMINE SULFATE 2.5; 2.5; 2.5; 2.5 MG/1; MG/1; MG/1; MG/1
1 TABLET ORAL 2 TIMES DAILY
Qty: 60 TABLET | Refills: 0 | Status: SHIPPED | OUTPATIENT
Start: 2023-02-15

## 2023-02-15 RX ORDER — DEXTROAMPHETAMINE SACCHARATE, AMPHETAMINE ASPARTATE, DEXTROAMPHETAMINE SULFATE AND AMPHETAMINE SULFATE 2.5; 2.5; 2.5; 2.5 MG/1; MG/1; MG/1; MG/1
TABLET ORAL
OUTPATIENT
Start: 2023-02-15

## 2023-02-15 RX ORDER — DUPILUMAB 300 MG/2ML
300 INJECTION, SOLUTION SUBCUTANEOUS
Qty: 12 ML | Refills: 3 | Status: SHIPPED | OUTPATIENT
Start: 2023-02-15 | End: 2023-03-16 | Stop reason: SDUPTHER

## 2023-02-15 NOTE — TELEPHONE ENCOUNTER
Left message advising pt per notes from Dr Ortiz; advised to return call to this office for further calrification

## 2023-02-15 NOTE — TELEPHONE ENCOUNTER
----- Message from Michaelle Malik sent at 2/15/2023 10:17 AM CST -----  Contact: Fernando  Type:  Patient Returning Call    Who Called:Fernando  Who Left Message for Patient:nurse  Does the patient know what this is regarding?:prescriptions  Would the patient rather a call back or a response via [facility]? Call back  Best Call Back Number:[phone]  Additional Information: n/a      Thanks  TS

## 2023-02-15 NOTE — TELEPHONE ENCOUNTER
----- Message from Slim Ortiz MD sent at 2/15/2023  7:34 AM CST -----  Regarding: refills  Would you please let him know two things...    First, the pa for Dupixent was denied by his insurance company.  I recent the prescription and will attempt to redo the PA, but I do not have any of his prescription coverage info, so I will need to wait for the pharmacy to regenerate a prior authorization.  If he wants to send us a copy of his prescription card, that would help speed up the process.  Alternatively, he can call the insurance company to see about filing an appeal for the medication.    Next, I am still waiting on the info from his previous PCP about his ADHD eval.  The PCP was Zain Sharma MD (Address: [address] [place] [address], [place] [address] Phone: [phone]).  If you guys can get a copy of his ADHD eval, that would be great.    In the meantime, let him know I went ahead and sent a one time refill of the Adderall to [facility].

## 2023-02-15 NOTE — PROGRESS NOTES
Still waiting on info from previous PCP.  Reviewed PDMP.  It does appear that Mr. Aggie fields has been filling regular prescriptions for Adderall 10 mg in [place].    Will go ahead and refill today.  Still awaiting info from PCP.

## 2023-03-07 DIAGNOSIS — J45.909 ASTHMA, UNSPECIFIED ASTHMA SEVERITY, UNSPECIFIED WHETHER COMPLICATED, UNSPECIFIED WHETHER PERSISTENT: Primary | ICD-10-CM

## 2023-03-09 DIAGNOSIS — J45.909 ASTHMA, UNSPECIFIED ASTHMA SEVERITY, UNSPECIFIED WHETHER COMPLICATED, UNSPECIFIED WHETHER PERSISTENT: Primary | ICD-10-CM

## 2023-03-13 ENCOUNTER — CLINICAL SUPPORT (OUTPATIENT)
Dept: PULMONOLOGY | Facility: CLINIC | Age: 48
End: 2023-03-13
Payer: COMMERCIAL

## 2023-03-13 ENCOUNTER — HOSPITAL ENCOUNTER (OUTPATIENT)
Dept: RADIOLOGY | Facility: HOSPITAL | Age: 48
Discharge: HOME | End: 2023-03-13
Attending: NURSE PRACTITIONER
Payer: COMMERCIAL

## 2023-03-13 DIAGNOSIS — J45.909 ASTHMA, UNSPECIFIED ASTHMA SEVERITY, UNSPECIFIED WHETHER COMPLICATED, UNSPECIFIED WHETHER PERSISTENT: ICD-10-CM

## 2023-03-13 LAB
BRPFT: ABNORMAL
DLCO SINGLE BREATH LLN: 20.18
DLCO SINGLE BREATH PRE REF: 111.2 %
DLCO SINGLE BREATH REF: 27.11
DLCOC SBVA LLN: 3.02
DLCOC SBVA REF: 4.44
DLCOC SINGLE BREATH LLN: 20.18
DLCOC SINGLE BREATH REF: 27.11
DLCOVA LLN: 3.02
DLCOVA PRE REF: 108.7 %
DLCOVA PRE: 4.82 ML/(MIN*MMHG*L) (ref 3.02–5.85)
DLCOVA REF: 4.44
ERV LLN: -16448.8
ERV PRE REF: 121.6 %
ERV REF: 1.2
FEF 25 75 CHG: 13 %
FEF 25 75 LLN: 1.75
FEF 25 75 POST REF: 86.3 %
FEF 25 75 PRE REF: 76.4 %
FEF 25 75 REF: 3.2
FET100 CHG: -1.1 %
FEV1 CHG: 8.4 %
FEV1 FVC CHG: 3.1 %
FEV1 FVC LLN: 69
FEV1 FVC POST REF: 96.9 %
FEV1 FVC PRE REF: 94 %
FEV1 FVC REF: 80
FEV1 LLN: 2.62
FEV1 POST REF: 102.9 %
FEV1 PRE REF: 94.9 %
FEV1 REF: 3.36
FRCPLETH LLN: 2.23
FRCPLETH PREREF: 114.8 %
FRCPLETH REF: 3.21
FVC CHG: 5.1 %
FVC LLN: 3.28
FVC POST REF: 106 %
FVC PRE REF: 100.9 %
FVC REF: 4.19
IVC PRE: 4.41 L (ref 3.28–5.11)
IVC SINGLE BREATH LLN: 3.28
IVC SINGLE BREATH PRE REF: 105.2 %
IVC SINGLE BREATH REF: 4.19
MVV LLN: 108
MVV PRE REF: 118.1 %
MVV REF: 128
PEF CHG: -7.9 %
PEF LLN: 6.79
PEF POST REF: 107.8 %
PEF PRE REF: 117 %
PEF REF: 8.79
POST FEF 25 75: 2.76 L/S (ref 1.75–5.1)
POST FET 100: 7.13 SEC
POST FEV1 FVC: 77.71 % (ref 69.19–89.57)
POST FEV1: 3.45 L (ref 2.62–4.06)
POST FVC: 4.44 L (ref 3.28–5.11)
POST PEF: 9.47 L/S (ref 6.79–10.78)
PRE DLCO: 30.14 ML/(MIN*MMHG) (ref 20.18–34.04)
PRE ERV: 1.46 L (ref -16448.8–16451.2)
PRE FEF 25 75: 2.45 L/S (ref 1.75–5.1)
PRE FET 100: 7.2 SEC
PRE FEV1 FVC: 75.38 % (ref 69.19–89.57)
PRE FEV1: 3.19 L (ref 2.62–4.06)
PRE FRC PL: 3.69 L (ref 2.23–4.2)
PRE FVC: 4.23 L (ref 3.28–5.11)
PRE MVV: 150.63 L/MIN (ref 108.4–146.66)
PRE PEF: 10.28 L/S (ref 6.79–10.78)
PRE RV: 2.23 L (ref 1.34–2.69)
PRE TLC: 6.71 L (ref 4.96–7.26)
RAW LLN: 3.06
RAW PRE REF: 4.9 %
RAW PRE: 0.15 CMH2O*S/L (ref 3.06–3.06)
RAW REF: 3.06
RV LLN: 1.34
RV PRE REF: 110.7 %
RV REF: 2.01
RVTLC LLN: 24
RVTLC PRE REF: 100.4 %
RVTLC PRE: 33.19 % (ref 24.09–42.05)
RVTLC REF: 33
TLC LLN: 4.96
TLC PRE REF: 109.7 %
TLC REF: 6.11
VA PRE: 6.25 L (ref 5.96–5.96)
VA SINGLE BREATH LLN: 5.96
VA SINGLE BREATH PRE REF: 104.8 %
VA SINGLE BREATH REF: 5.96
VC LLN: 3.28
VC PRE REF: 106.9 %
VC PRE: 4.48 L (ref 3.28–5.11)
VC REF: 4.19

## 2023-03-13 PROCEDURE — 94729 DIFFUSING CAPACITY: CPT | Mod: S$GLB,,, | Performed by: INTERNAL MEDICINE

## 2023-03-13 PROCEDURE — 71046 X-RAY EXAM CHEST 2 VIEWS: CPT | Mod: TC,FY,PO

## 2023-03-13 PROCEDURE — 71046 XR CHEST PA AND LATERAL: ICD-10-PCS | Mod: 26,,, | Performed by: RADIOLOGY

## 2023-03-13 PROCEDURE — 94060 EVALUATION OF WHEEZING: CPT | Mod: S$GLB,,, | Performed by: INTERNAL MEDICINE

## 2023-03-13 PROCEDURE — 71046 X-RAY EXAM CHEST 2 VIEWS: CPT | Mod: 26,,, | Performed by: RADIOLOGY

## 2023-03-13 PROCEDURE — 94726 PLETHYSMOGRAPHY LUNG VOLUMES: CPT | Mod: S$GLB,,, | Performed by: INTERNAL MEDICINE

## 2023-03-13 PROCEDURE — 94726 PULM FUNCT TST PLETHYSMOGRAP: ICD-10-PCS | Mod: S$GLB,,, | Performed by: INTERNAL MEDICINE

## 2023-03-13 PROCEDURE — 94060 PR EVAL OF BRONCHOSPASM: ICD-10-PCS | Mod: S$GLB,,, | Performed by: INTERNAL MEDICINE

## 2023-03-13 PROCEDURE — 94729 PR C02/MEMBANE DIFFUSE CAPACITY: ICD-10-PCS | Mod: S$GLB,,, | Performed by: INTERNAL MEDICINE

## 2023-03-16 ENCOUNTER — OFFICE VISIT (OUTPATIENT)
Dept: PULMONOLOGY | Facility: CLINIC | Age: 48
End: 2023-03-16
Payer: COMMERCIAL

## 2023-03-16 VITALS
SYSTOLIC BLOOD PRESSURE: 120 MMHG | HEART RATE: 60 BPM | HEIGHT: 65 IN | WEIGHT: 186.94 LBS | BODY MASS INDEX: 31.14 KG/M2 | OXYGEN SATURATION: 99 % | RESPIRATION RATE: 16 BRPM | DIASTOLIC BLOOD PRESSURE: 70 MMHG

## 2023-03-16 DIAGNOSIS — R76.8 ELEVATED IGE LEVEL: ICD-10-CM

## 2023-03-16 DIAGNOSIS — J45.50 SEVERE PERSISTENT ASTHMA WITHOUT COMPLICATION: Primary | ICD-10-CM

## 2023-03-16 DIAGNOSIS — J30.89 ENVIRONMENTAL AND SEASONAL ALLERGIES: ICD-10-CM

## 2023-03-16 PROCEDURE — 3078F DIAST BP <80 MM HG: CPT | Mod: CPTII,S$GLB,, | Performed by: NURSE PRACTITIONER

## 2023-03-16 PROCEDURE — 3074F SYST BP LT 130 MM HG: CPT | Mod: CPTII,S$GLB,, | Performed by: NURSE PRACTITIONER

## 2023-03-16 PROCEDURE — 99999 PR PBB SHADOW E&M-EST. PATIENT-LVL V: ICD-10-PCS | Mod: PBBFAC,,, | Performed by: NURSE PRACTITIONER

## 2023-03-16 PROCEDURE — 3008F BODY MASS INDEX DOCD: CPT | Mod: CPTII,S$GLB,, | Performed by: NURSE PRACTITIONER

## 2023-03-16 PROCEDURE — 4010F PR ACE/ARB THEARPY RXD/TAKEN: ICD-10-PCS | Mod: CPTII,S$GLB,, | Performed by: NURSE PRACTITIONER

## 2023-03-16 PROCEDURE — 3078F PR MOST RECENT DIASTOLIC BLOOD PRESSURE < 80 MM HG: ICD-10-PCS | Mod: CPTII,S$GLB,, | Performed by: NURSE PRACTITIONER

## 2023-03-16 PROCEDURE — 3074F PR MOST RECENT SYSTOLIC BLOOD PRESSURE < 130 MM HG: ICD-10-PCS | Mod: CPTII,S$GLB,, | Performed by: NURSE PRACTITIONER

## 2023-03-16 PROCEDURE — 4010F ACE/ARB THERAPY RXD/TAKEN: CPT | Mod: CPTII,S$GLB,, | Performed by: NURSE PRACTITIONER

## 2023-03-16 PROCEDURE — 1159F PR MEDICATION LIST DOCUMENTED IN MEDICAL RECORD: ICD-10-PCS | Mod: CPTII,S$GLB,, | Performed by: NURSE PRACTITIONER

## 2023-03-16 PROCEDURE — 1160F PR REVIEW ALL MEDS BY PRESCRIBER/CLIN PHARMACIST DOCUMENTED: ICD-10-PCS | Mod: CPTII,S$GLB,, | Performed by: NURSE PRACTITIONER

## 2023-03-16 PROCEDURE — 99999 PR PBB SHADOW E&M-EST. PATIENT-LVL V: CPT | Mod: PBBFAC,,, | Performed by: NURSE PRACTITIONER

## 2023-03-16 PROCEDURE — 1159F MED LIST DOCD IN RCRD: CPT | Mod: CPTII,S$GLB,, | Performed by: NURSE PRACTITIONER

## 2023-03-16 PROCEDURE — 99204 OFFICE O/P NEW MOD 45 MIN: CPT | Mod: S$GLB,,, | Performed by: NURSE PRACTITIONER

## 2023-03-16 PROCEDURE — 3008F PR BODY MASS INDEX (BMI) DOCUMENTED: ICD-10-PCS | Mod: CPTII,S$GLB,, | Performed by: NURSE PRACTITIONER

## 2023-03-16 PROCEDURE — 1160F RVW MEDS BY RX/DR IN RCRD: CPT | Mod: CPTII,S$GLB,, | Performed by: NURSE PRACTITIONER

## 2023-03-16 PROCEDURE — 99204 PR OFFICE/OUTPT VISIT, NEW, LEVL IV, 45-59 MIN: ICD-10-PCS | Mod: S$GLB,,, | Performed by: NURSE PRACTITIONER

## 2023-03-16 RX ORDER — DUPILUMAB 300 MG/2ML
300 INJECTION, SOLUTION SUBCUTANEOUS
Qty: 12 ML | Refills: 3 | Status: SHIPPED | OUTPATIENT
Start: 2023-03-16 | End: 2023-10-04 | Stop reason: SDUPTHER

## 2023-03-16 RX ORDER — FLUTICASONE PROPIONATE 50 MCG
SPRAY, SUSPENSION (ML) NASAL
COMMUNITY

## 2023-03-16 NOTE — PROGRESS NOTES
"Subjective:      Patient ID: Fernando Hoff is a 48 y.o. male.    Chief Complaint: Asthma    Asthma  His past medical history is significant for asthma.   Consult for asthma.  Patient states he was diagnosed with asthma 12 years ago during a training session.  Bronchospasms triggered by cold weather and exercise.  He was a .  Not due to chemical exposure. He states he was transported to the emergency room with hypoxia which resulted and acute kidney injury.  He was treated for his asthma in [facility].  He was on Dupixent shots, but recently denied with new insurance.  He takes Trelegy daily.  He is now taking his albuterol once to twice a week. Lifetime of allergies. Took allergy shots in the past.  Treated for exacerbation 1/23/23.   Recurrent exacerbations with steroid use which improved with Dupixent.  nonsmoker    Patient Active Problem List   Diagnosis    Low testosterone in male     /70   Pulse 60   Resp 16   Ht 5' 5" (1.651 m)   Wt 84.8 kg (186 lb 15.2 oz)   SpO2 99%   BMI 31.11 kg/m²   Body mass index is 31.11 kg/m².    Review of Systems   HENT:  Positive for congestion.    All other systems reviewed and are negative.  Objective:      Physical Exam  Constitutional:       Appearance: Normal appearance. He is well-developed.   HENT:      Head: Normocephalic and atraumatic.      Nose: Nose normal.      Mouth/Throat:      Pharynx: Oropharynx is clear.   Neck:      Thyroid: No thyroid mass or thyromegaly.      Trachea: Trachea normal.   Cardiovascular:      Rate and Rhythm: Normal rate and regular rhythm.      Heart sounds: Normal heart sounds.   Pulmonary:      Effort: Pulmonary effort is normal.      Breath sounds: Normal breath sounds. No wheezing, rhonchi or rales.   Abdominal:      Palpations: Abdomen is soft. There is no splenomegaly or mass.      Tenderness: There is no abdominal tenderness.   Musculoskeletal:         General: Normal range of motion.      Cervical back: Normal " range of motion and neck supple.   Skin:     General: Skin is warm and dry.   Neurological:      Mental Status: He is alert and oriented to person, place, and time.   Psychiatric:         Mood and Affect: Mood normal.         Behavior: Behavior normal.     Personal Diagnostic Review    Results for orders placed during the hospital encounter of 03/13/23    X-Ray Chest PA And Lateral    Narrative  EXAMINATION:  XR CHEST PA AND LATERAL    CLINICAL HISTORY:  Unspecified asthma, uncomplicated    TECHNIQUE:  PA and lateral views of the chest were performed.    COMPARISON:  None    FINDINGS:  The cardiac and mediastinal silhouettes appear within normal limits.   The lungs are clear bilaterally.  No acute osseous findings demonstrated.    Impression  No acute findings.      Electronically signed by: Jose Guadalupe Wen MD  Date:    03/13/2023  Time:    15:48    Results    Results  Lab Results         Results  Date Name Specimen Result Interpretation Description Value Range Status Address                                                                                                                                                                                           06/15/2022 Ige, Total, Serum   High   Immunoglobulin E, Total 526 IU/mL 6-495 IU/mL Final [facility]: [facility] [address], [place]    06/15/2022 Aspergillus Fumigatus Ige, Serum       E643-RoI Aspergillus Fumigatus <0.10 kU/L class 0 kU/L Final [facility]: [address], [place]    06/15/2022 Complement, Total, Serum       Complement, 45 U/mL >41 U/mL Final [facility]: [facility] [address], [place]    06/15/2022 ACE (Angiotensin-converting Enzyme), Serum       Ace 22 U/L 14-82 U/L Final [facility]: [facility] [address], [place]    06/15/2022 Aspergillus Fumigatus Precipitating Igg Ab, Serum   High   M003-IgG Aspergillus Fumigatus >200.0 ug/mL 0.0-1.9 ug/mL Final [facility]:  [facility] [address], [place]    06/15/2022 Immunoglobulins Iga+igg+igm, Quantitative, Serum       Immunoglobulin g, Qn, Serum 1250 mg/dL 603-1613 mg/dL Final [facility]: [facility] [address], [place]              Immunoglobulin a, Qn, Serum 250 mg/dL  mg/dL Final [facility]: [facility] [address], [place]              Immunoglobulin M, Qn, Serum 62 mg/dL  mg/dL Final [facility]: [facility] [address], [place]    03/01/2022 Ige, Total, Serum   High   Immunoglobulin E, Total 575 IU/mL 6-495 IU/mL Final [facility]: [facility] [address], [place]    03/01/2022 Cbc       Wbc 8.4 10^3/uL 4.5-11.0 10^3/uL Final [facility]: [facility] [address], [place]              Ne# 5.3 10^3/uL 1.8-7.7 10^3/uL Final [facility]: [facility] [address], [place]              Ne% 62.6 % 48.0-70.0 % [facility]: [facility] [address], [place]              Ly# 2.0 10^3/uL 1.0-4.8 10^3/uL Final [facility]: [facility] [address], [place]              Ly% 24.1 % 20.0-46.0 % [facility] Lab: [facility] [address], [place]          High   Mo# 0.7 10^3/uL 0.3-0.6 10^3/uL Final [facility]: [facility] [address], [place]              Mo% 8.9 % 5.5-11.7 % [facility] Lab: [facility] [address], [place]              Eo# 0.3 10^3/uL 0.0-1.2 10^3/uL Final [facility]: [facility] [address], [place]          High   Eo% 3.4 % 0.9-2.9 % Final [facility]: [address], [place]              Ba# 0.1 10^3/uL 0.0-0.1 10^3/uL Final [facility]: [address], [place]              Ba% 1.0 % 0.2-1.0 % Final [facility]: [address], [place]              Rbc 4.9 10^6/uL 4.7-6.1 10^6/uL Final [facility]: [address], [place]              Hgb 15.2 g/dL  12.5-16.5 g/dL Final [facility]: [address], [place]              Hct 43.8 % 42.0-52.0 % Final [facility]: [address], [place]              Mcv 90.2 fL 80.0-94.0 fL Final [facility] Lab: [address], [place]          High   Mch 31.2 pg 27.0-31.0 pg Final [facility] Lab: [address], [place]              Mchc 34.6 g/dL 32.0-36.0 g/dL Final [facility]: [facility] [address], [place]              Rdw 13.7 % 11.5-15.5 % Final [facility] Lab: [address], [place]              Plt 332 10^3/uL 130-400 10^3/uL Final [facility]: [address], [place]          Low   Mpv 7.3 fL 7.4-10.4 fL Final [facility] Lab: [address], [place]        PFT: normal    Assessment:       1. Severe persistent asthma without complication    2. Elevated IgE level    3. Environmental and seasonal allergies          Outpatient Encounter Medications as of 3/16/2023   Medication Sig Dispense Refill    albuterol (VENTOLIN HFA) 90 mcg/actuation inhaler Inhale 2 puffs into the lungs every 6 (six) hours as needed for Wheezing or Shortness of Breath. Rescue inhaler. 18 g 3    dextroamphetamine-amphetamine (ADDERALL) 10 mg Tab Take 1 tablet (10 mg total) by mouth 2 (two) times a day. 60 tablet 0    dupilumab (DUPIXENT SYRINGE) 300 mg/2 mL Syrg Inject 2 mLs (300 mg total) into the skin every 14 (fourteen) days. 12 mL 3    fluticasone propionate (FLONASE) 50 mcg/actuation nasal spray fluticasone propionate 50 mcg/actuation nasal spray,suspension   USE 2 SPRAYS IN EACH NOSTRIL EVERY DAY AS NEEDED FOR CONGESTION      fluticasone-umeclidin-vilanter (TRELEGY ELLIPTA) 100-62.5-25 mcg DsDv Inhale 1 puff into the lungs once daily. 60 each 5    losartan-hydrochlorothiazide 100-25 mg (HYZAAR) 100-25 mg per tablet Take 1 tablet by mouth once daily. 90 tablet 3    testosterone enanthate  (XYOSTED) 75 mg/0.5 mL AtIn Inject 75 mg into the skin once a week. 4 each 5     No facility-administered encounter medications on file as of 3/16/2023.     Orders Placed This Encounter   Procedures    Ambulatory referral/consult to Allergy     Standing Status:   Future     Standing Expiration Date:   2024     Referral Priority:   Routine     Referral Type:   Allergy Testing     Referral Reason:   Specialty Services Required     Requested Specialty:   Allergy     Number of Visits Requested:   1    Fraction of  Nitric Oxide     Standing Status:   Future     Standing Expiration Date:   3/16/2024     Order Specific Question:   Release to patient     Answer:   Immediate     Plan:   Benefiting from Dupixent. Recently moved from [place].   Sent prescription refill to [facility] for approval.  Refer to Dr. Stevenson allergy  FeNo  Continue Trelegy.      Problem List Items Addressed This Visit    None  Visit Diagnoses       Severe persistent asthma without complication    -  Primary    Relevant Orders    Fraction of  Nitric Oxide    Ambulatory referral/consult to Allergy    Elevated IgE level        Environmental and seasonal allergies                Thank you Dr. Ortiz for this consultation.               Elizabeth LeJeune, ACNP, ANP

## 2023-03-18 ENCOUNTER — SPECIALTY PHARMACY (OUTPATIENT)
Dept: PHARMACY | Facility: CLINIC | Age: 48
End: 2023-03-18
Payer: COMMERCIAL

## 2023-03-18 DIAGNOSIS — J45.50 SEVERE PERSISTENT ASTHMA WITHOUT COMPLICATION: Primary | ICD-10-CM

## 2023-03-18 NOTE — TELEPHONE ENCOUNTER
Rx received for Dupixent for asthma. Continuation of therapy. New Insurance requires a new PA and that prescription be filled at [facility]. Submitting PA via CMM.      Unable to submit via CMM. Called to do a verbal request. Spoke to Belem at [facility] PA dept. Urgent verbal request complete.    PA Case ID: [id number]    Response should be faxed to [facility] in 24 hours.      Destinee, this is Sal Cash with [facility].  We are working on your prescription that your doctor has sent us. We will be working with your insurance to get this approved for you. We will be calling you along the way with updates on your medication.  If you have any questions, you can reach us at [phone].    Welcome call outcome: Patient/caregiver reached

## 2023-03-23 ENCOUNTER — TELEPHONE (OUTPATIENT)
Dept: PRIMARY CARE CLINIC | Facility: CLINIC | Age: 48
End: 2023-03-23
Payer: COMMERCIAL

## 2023-03-23 DIAGNOSIS — Z12.11 COLON CANCER SCREENING: Primary | ICD-10-CM

## 2023-03-23 NOTE — TELEPHONE ENCOUNTER
----- Message from Rosalee Swan sent at 2/24/2023  9:38 AM CST -----  Regarding: home fit kit  Contact: patient  Patient has order for a colonoscopy he prefer a home kit and asking if one can be mailed to him @ [address] [facility]   [address]   [place] [id number]      Thanks

## 2023-03-24 NOTE — TELEPHONE ENCOUNTER
PA initially was denied. Spoke to Kaitlyn at [facility] to verify that patient has been dependent on oral corticosteroids and PA was approved.     PA for Dupixent approved through 9/24/23.

## 2023-03-27 NOTE — TELEPHONE ENCOUNTER
PA approved Ref # [id number] approved 3/24/23 - 9/24/23    Pt is locked into filling at [facility]. Routing rx to [facility]. Closing out referral at [facility].    Informational Purposes Only  Dupixent copay card  Your card was activated on 02- and is approved for use beginning 02- through the program expiration date, 02-.  BIN [id number]  [place] [address]/loyalty  Co-pay Card ID  [id number]   Group [id number]   Email sent to pt      Approval letter scanned into media.    Outgoing call to pt to inform . TREVOR

## 2023-04-03 ENCOUNTER — TELEPHONE (OUTPATIENT)
Dept: PRIMARY CARE CLINIC | Facility: CLINIC | Age: 48
End: 2023-04-03
Payer: COMMERCIAL

## 2023-04-03 ENCOUNTER — TELEPHONE (OUTPATIENT)
Dept: PULMONOLOGY | Facility: CLINIC | Age: 48
End: 2023-04-03
Payer: COMMERCIAL

## 2023-04-03 NOTE — TELEPHONE ENCOUNTER
Pt reports he has a 'sinus infection that usually happens may 3 times a year'; pt was advised Dr Ortiz may want him to keep his OV tomorrow.    Pt stated he also reports he will be seeing an Allergist the 18th of this month.    Pt wants to know if Dr Ortiz can send something to his pharmacy to help with his sinus issues WITHOUT coming into the office.

## 2023-04-03 NOTE — TELEPHONE ENCOUNTER
----- Message from Vicki Richter sent at 4/3/2023 10:15 AM CDT -----  Contact: [phone]  Patient has a sinus infection and would like antibiotics called in to [facility] in [place].Thanks

## 2023-04-05 ENCOUNTER — TELEPHONE (OUTPATIENT)
Dept: PRIMARY CARE CLINIC | Facility: CLINIC | Age: 48
End: 2023-04-05
Payer: COMMERCIAL

## 2023-04-05 ENCOUNTER — OFFICE VISIT (OUTPATIENT)
Dept: PRIMARY CARE CLINIC | Facility: CLINIC | Age: 48
End: 2023-04-05
Payer: COMMERCIAL

## 2023-04-05 VITALS
HEIGHT: 65 IN | SYSTOLIC BLOOD PRESSURE: 110 MMHG | WEIGHT: 183.88 LBS | BODY MASS INDEX: 30.64 KG/M2 | HEART RATE: 68 BPM | TEMPERATURE: 98 F | DIASTOLIC BLOOD PRESSURE: 80 MMHG

## 2023-04-05 DIAGNOSIS — F90.9 ATTENTION DEFICIT HYPERACTIVITY DISORDER (ADHD), UNSPECIFIED ADHD TYPE: ICD-10-CM

## 2023-04-05 DIAGNOSIS — J06.9 URI WITH COUGH AND CONGESTION: ICD-10-CM

## 2023-04-05 DIAGNOSIS — J01.00 ACUTE MAXILLARY SINUSITIS, RECURRENCE NOT SPECIFIED: Primary | ICD-10-CM

## 2023-04-05 PROCEDURE — 3008F BODY MASS INDEX DOCD: CPT | Mod: CPTII,S$GLB,, | Performed by: FAMILY MEDICINE

## 2023-04-05 PROCEDURE — 3074F SYST BP LT 130 MM HG: CPT | Mod: CPTII,S$GLB,, | Performed by: FAMILY MEDICINE

## 2023-04-05 PROCEDURE — 96372 PR INJECTION,THERAP/PROPH/DIAG2ST, IM OR SUBCUT: ICD-10-PCS | Mod: S$GLB,,, | Performed by: FAMILY MEDICINE

## 2023-04-05 PROCEDURE — 99999 PR PBB SHADOW E&M-EST. PATIENT-LVL IV: CPT | Mod: PBBFAC,,, | Performed by: FAMILY MEDICINE

## 2023-04-05 PROCEDURE — 99999 PR PBB SHADOW E&M-EST. PATIENT-LVL IV: ICD-10-PCS | Mod: PBBFAC,,, | Performed by: FAMILY MEDICINE

## 2023-04-05 PROCEDURE — 1160F PR REVIEW ALL MEDS BY PRESCRIBER/CLIN PHARMACIST DOCUMENTED: ICD-10-PCS | Mod: CPTII,S$GLB,, | Performed by: FAMILY MEDICINE

## 2023-04-05 PROCEDURE — 4010F PR ACE/ARB THEARPY RXD/TAKEN: ICD-10-PCS | Mod: CPTII,S$GLB,, | Performed by: FAMILY MEDICINE

## 2023-04-05 PROCEDURE — 99214 PR OFFICE/OUTPT VISIT, EST, LEVL IV, 30-39 MIN: ICD-10-PCS | Mod: 25,S$GLB,, | Performed by: FAMILY MEDICINE

## 2023-04-05 PROCEDURE — 3079F PR MOST RECENT DIASTOLIC BLOOD PRESSURE 80-89 MM HG: ICD-10-PCS | Mod: CPTII,S$GLB,, | Performed by: FAMILY MEDICINE

## 2023-04-05 PROCEDURE — 96372 THER/PROPH/DIAG INJ SC/IM: CPT | Mod: S$GLB,,, | Performed by: FAMILY MEDICINE

## 2023-04-05 PROCEDURE — 99214 OFFICE O/P EST MOD 30 MIN: CPT | Mod: 25,S$GLB,, | Performed by: FAMILY MEDICINE

## 2023-04-05 PROCEDURE — 4010F ACE/ARB THERAPY RXD/TAKEN: CPT | Mod: CPTII,S$GLB,, | Performed by: FAMILY MEDICINE

## 2023-04-05 PROCEDURE — 3079F DIAST BP 80-89 MM HG: CPT | Mod: CPTII,S$GLB,, | Performed by: FAMILY MEDICINE

## 2023-04-05 PROCEDURE — 1160F RVW MEDS BY RX/DR IN RCRD: CPT | Mod: CPTII,S$GLB,, | Performed by: FAMILY MEDICINE

## 2023-04-05 PROCEDURE — 1159F PR MEDICATION LIST DOCUMENTED IN MEDICAL RECORD: ICD-10-PCS | Mod: CPTII,S$GLB,, | Performed by: FAMILY MEDICINE

## 2023-04-05 PROCEDURE — 3008F PR BODY MASS INDEX (BMI) DOCUMENTED: ICD-10-PCS | Mod: CPTII,S$GLB,, | Performed by: FAMILY MEDICINE

## 2023-04-05 PROCEDURE — 3074F PR MOST RECENT SYSTOLIC BLOOD PRESSURE < 130 MM HG: ICD-10-PCS | Mod: CPTII,S$GLB,, | Performed by: FAMILY MEDICINE

## 2023-04-05 PROCEDURE — 1159F MED LIST DOCD IN RCRD: CPT | Mod: CPTII,S$GLB,, | Performed by: FAMILY MEDICINE

## 2023-04-05 RX ORDER — PROMETHAZINE HYDROCHLORIDE AND DEXTROMETHORPHAN HYDROBROMIDE 6.25; 15 MG/5ML; MG/5ML
5 SYRUP ORAL EVERY 6 HOURS PRN
Qty: 120 ML | Refills: 0 | Status: SHIPPED | OUTPATIENT
Start: 2023-04-05 | End: 2023-04-15

## 2023-04-05 RX ORDER — HYDROCHLOROTHIAZIDE 25 MG/1
25 TABLET ORAL
COMMUNITY
Start: 2023-03-29

## 2023-04-05 RX ORDER — PREDNISONE 20 MG/1
40 TABLET ORAL DAILY
Qty: 10 TABLET | Refills: 0 | Status: SHIPPED | OUTPATIENT
Start: 2023-04-05 | End: 2023-04-10

## 2023-04-05 RX ORDER — DEXTROAMPHETAMINE SACCHARATE, AMPHETAMINE ASPARTATE, DEXTROAMPHETAMINE SULFATE AND AMPHETAMINE SULFATE 2.5; 2.5; 2.5; 2.5 MG/1; MG/1; MG/1; MG/1
1 TABLET ORAL 2 TIMES DAILY
Qty: 60 TABLET | Refills: 0 | Status: SHIPPED | OUTPATIENT
Start: 2023-06-04 | End: 2023-07-04

## 2023-04-05 RX ORDER — DEXTROAMPHETAMINE SACCHARATE, AMPHETAMINE ASPARTATE, DEXTROAMPHETAMINE SULFATE AND AMPHETAMINE SULFATE 2.5; 2.5; 2.5; 2.5 MG/1; MG/1; MG/1; MG/1
1 TABLET ORAL 2 TIMES DAILY
Qty: 60 TABLET | Refills: 0 | Status: SHIPPED | OUTPATIENT
Start: 2023-05-05 | End: 2023-06-04

## 2023-04-05 RX ORDER — AMOXICILLIN AND CLAVULANATE POTASSIUM 875; 125 MG/1; MG/1
1 TABLET, FILM COATED ORAL EVERY 12 HOURS
Qty: 14 TABLET | Refills: 0 | Status: SHIPPED | OUTPATIENT
Start: 2023-04-05 | End: 2023-04-12

## 2023-04-05 RX ORDER — DEXTROAMPHETAMINE SACCHARATE, AMPHETAMINE ASPARTATE, DEXTROAMPHETAMINE SULFATE AND AMPHETAMINE SULFATE 2.5; 2.5; 2.5; 2.5 MG/1; MG/1; MG/1; MG/1
1 TABLET ORAL 2 TIMES DAILY
Qty: 60 TABLET | Refills: 0 | Status: SHIPPED | OUTPATIENT
Start: 2023-04-05 | End: 2023-05-05

## 2023-04-05 RX ORDER — BETAMETHASONE SODIUM PHOSPHATE AND BETAMETHASONE ACETATE 3; 3 MG/ML; MG/ML
9 INJECTION, SUSPENSION INTRA-ARTICULAR; INTRALESIONAL; INTRAMUSCULAR; SOFT TISSUE
Status: COMPLETED | OUTPATIENT
Start: 2023-04-05 | End: 2023-04-05

## 2023-04-05 RX ADMIN — BETAMETHASONE SODIUM PHOSPHATE AND BETAMETHASONE ACETATE 9 MG: 3; 3 INJECTION, SUSPENSION INTRA-ARTICULAR; INTRALESIONAL; INTRAMUSCULAR; SOFT TISSUE at 11:04

## 2023-04-05 NOTE — PROGRESS NOTES
"    [facility]       [address] [place] [address]      Phone: [phone]      Fax: [phone]    Slim Ortiz MD                Office Visit  04/05/2023        Subjective      HPI:  Fernando Hoff is a 48 y.o. male presents today in clinic for "Sinus Problem  ."     48-year-old gentleman presents today complaining of sinus issues.      Sinuses have been acting up for the last two weeks.  Already has history of allergies.  States that things have gotten worse over the last couple of weeks.  When he lays down, feels lots of pain, pressure in his sinuses, especially in his cheeks.  Lots of congestion.  Subjective fever for the last two days.  Cough, productive sputum.  No chills, body aches.    Got his Cologuard kit in the mail.  Has not completed it yet.    Was finally able to get his Dupixent injection refilled from pulmonology.  Should be arriving today or tomorrow.    Has ADHD.  Currently takes Adderall 10 mg, twice a day.  Due next week for refill.    PMH:  HTN.  Asthma.  Allergies.  ADHD.  Low T.    PSH: Left shoulder x2.  Left ankle/foot.  Nasal turbinates.  Deviated septum.    F MH: CAD/CABG.  HTN.  Thyroid.  Unknown cancer.    Allergies:  NKDA   Social:  Works as a  for [employer].    T: Denies   A:  Occasionally  D: Denies    Exercise:  Goes to the gym daily.      Colon:  Had one in the past secondary to blood in his stools.      The following were updated and reviewed by myself in the chart: medications, past medical history, past surgical history, family history, social history, and allergies.     Medications:  Current Outpatient Medications on File Prior to Visit   Medication Sig Dispense Refill    albuterol (VENTOLIN HFA) 90 mcg/actuation inhaler Inhale 2 puffs into the lungs every 6 (six) hours as needed for Wheezing or Shortness of Breath. Rescue inhaler. 18 g 3    dextroamphetamine-amphetamine (ADDERALL) 10 mg Tab " Take 1 tablet (10 mg total) by mouth 2 (two) times a day. 60 tablet 0    dupilumab (DUPIXENT SYRINGE) 300 mg/2 mL Syrg Inject 2 mLs (300 mg total) into the skin every 14 (fourteen) days. 12 mL 3    fluticasone-umeclidin-vilanter (TRELEGY ELLIPTA) 100-62.5-25 mcg DsDv Inhale 1 puff into the lungs once daily. 60 each 5    hydroCHLOROthiazide (HYDRODIURIL) 25 MG tablet Take 25 mg by mouth.      losartan-hydrochlorothiazide 100-25 mg (HYZAAR) 100-25 mg per tablet Take 1 tablet by mouth once daily. 90 tablet 3    testosterone enanthate (XYOSTED) 75 mg/0.5 mL AtIn Inject 75 mg into the skin once a week. 4 each 5    fluticasone propionate (FLONASE) 50 mcg/actuation nasal spray fluticasone propionate 50 mcg/actuation nasal spray,suspension   USE 2 SPRAYS IN EACH NOSTRIL EVERY DAY AS NEEDED FOR CONGESTION       No current facility-administered medications on file prior to visit.        PMHx:  Past Medical History:   Diagnosis Date    Hypertension     Low testosterone       Patient Active Problem List    Diagnosis Date Noted    Low testosterone in male 01/31/2023        PSHx:  Past Surgical History:   Procedure Laterality Date    ANKLE SURGERY Left 2020 2021    FOOT SURGERY Left 2020 2021    NASAL SEPTUM SURGERY  2012    SHOULDER SURGERY Right 2018        FHx:  Family History   Problem Relation Age of Onset    Hypertension Mother     Thyroid disease Mother     Heart disease Father     Hypertension Father     Allergies Father         Social:  Social History     Socioeconomic History    Marital status: Single   Tobacco Use    Smoking status: Never    Smokeless tobacco: Never   Substance and Sexual Activity    Alcohol use: Yes     Comment: on ooccasion    Drug use: Never    Sexual activity: Yes     Partners: Female     Birth control/protection: OCP        Allergies:  Review of patient's allergies indicates:  No Known Allergies     ROS:  Review of Systems   Constitutional:  Positive for activity change, fatigue and fever.  "Negative for appetite change and chills.   HENT:  Positive for congestion, postnasal drip, rhinorrhea, sinus pressure and sinus pain. Negative for sore throat and trouble swallowing.    Respiratory:  Positive for cough. Negative for shortness of breath.    Cardiovascular:  Negative for chest pain and palpitations.   Gastrointestinal:  Negative for abdominal pain, constipation, diarrhea, nausea and vomiting.   Genitourinary:  Negative for difficulty urinating.   Musculoskeletal:  Negative for arthralgias and myalgias.   Skin:  Negative for color change and rash.   Neurological:  Positive for headaches.   All other systems reviewed and are negative.       Objective      /80   Pulse 68   Temp 97.9 °F (36.6 °C)   Ht 5' 5" (1.651 m)   Wt 83.4 kg (183 lb 13.8 oz)   BMI 30.60 kg/m²   Ht Readings from Last 3 Encounters:   04/05/23 5' 5" (1.651 m)   03/16/23 5' 5" (1.651 m)   01/31/23 5' 5" (1.651 m)     Wt Readings from Last 3 Encounters:   04/05/23 83.4 kg (183 lb 13.8 oz)   03/16/23 84.8 kg (186 lb 15.2 oz)   01/31/23 86 kg (189 lb 9.5 oz)       PHYSICAL EXAM:  Physical Exam  Vitals and nursing note reviewed.   Constitutional:       General: He is not in acute distress.     Appearance: Normal appearance.   HENT:      Head: Normocephalic and atraumatic.      Right Ear: Tympanic membrane, ear canal and external ear normal.      Left Ear: Tympanic membrane, ear canal and external ear normal.      Nose: Congestion present. No rhinorrhea.      Right Sinus: Maxillary sinus tenderness present. No frontal sinus tenderness.      Left Sinus: Maxillary sinus tenderness present. No frontal sinus tenderness.      Mouth/Throat:      Mouth: Mucous membranes are moist.      Pharynx: Oropharynx is clear. No oropharyngeal exudate or posterior oropharyngeal erythema.   Eyes:      Extraocular Movements: Extraocular movements intact.      Conjunctiva/sclera: Conjunctivae normal.      Pupils: Pupils are equal, round, and reactive to " light.   Cardiovascular:      Rate and Rhythm: Normal rate and regular rhythm.   Pulmonary:      Effort: Pulmonary effort is normal.      Breath sounds: No wheezing, rhonchi or rales.   Musculoskeletal:         General: Normal range of motion.      Cervical back: Normal range of motion.   Lymphadenopathy:      Cervical: No cervical adenopathy.   Skin:     General: Skin is warm and dry.   Neurological:      General: No focal deficit present.      Mental Status: He is alert.            LABS / IMAGING:  Recent Results (from the past 4368 hour(s))   CBC Auto Differential    Collection Time: 01/13/23 12:45 PM   Result Value Ref Range    WBC 8.71 3.90 - 12.70 K/uL    RBC 5.00 4.60 - 6.20 M/uL    Hemoglobin 15.3 14.0 - 18.0 g/dL    Hematocrit 47.0 40.0 - 54.0 %    MCV 94 82 - 98 fL    MCH 30.6 27.0 - 31.0 pg    MCHC 32.6 32.0 - 36.0 g/dL    RDW 13.5 11.5 - 14.5 %    Platelets 406 150 - 450 K/uL    MPV 9.6 9.2 - 12.9 fL    Immature Granulocytes 2.0 (H) 0.0 - 0.5 %    Gran # (ANC) 6.1 1.8 - 7.7 K/uL    Immature Grans (Abs) 0.17 (H) 0.00 - 0.04 K/uL    Lymph # 1.7 1.0 - 4.8 K/uL    Mono # 0.7 0.3 - 1.0 K/uL    Eos # 0.1 0.0 - 0.5 K/uL    Baso # 0.06 0.00 - 0.20 K/uL    nRBC 0 0 /100 WBC    Gran % 69.6 38.0 - 73.0 %    Lymph % 18.9 18.0 - 48.0 %    Mono % 8.0 4.0 - 15.0 %    Eosinophil % 0.8 0.0 - 8.0 %    Basophil % 0.7 0.0 - 1.9 %    Differential Method Automated    Comprehensive Metabolic Panel    Collection Time: 01/13/23 12:45 PM   Result Value Ref Range    Sodium 137 136 - 145 mmol/L    Potassium 3.5 3.5 - 5.1 mmol/L    Chloride 100 95 - 110 mmol/L    CO2 24 23 - 29 mmol/L    Glucose 66 (L) 70 - 110 mg/dL    BUN 26 (H) 6 - 20 mg/dL    Creatinine 0.9 0.5 - 1.4 mg/dL    Calcium 9.3 8.7 - 10.5 mg/dL    Total Protein 7.4 6.0 - 8.4 g/dL    Albumin 3.8 3.5 - 5.2 g/dL    Total Bilirubin 1.0 0.1 - 1.0 mg/dL    Alkaline Phosphatase 84 55 - 135 U/L    AST 31 10 - 40 U/L    ALT 41 10 - 44 U/L    Anion Gap 13 8 - 16 mmol/L    eGFR  >60.0 >60 mL/min/1.73 m^2   TSH    Collection Time: 01/13/23 12:45 PM   Result Value Ref Range    TSH 1.592 0.400 - 4.000 uIU/mL   Lipid Panel    Collection Time: 01/13/23 12:45 PM   Result Value Ref Range    Cholesterol 199 120 - 199 mg/dL    Triglycerides 84 30 - 150 mg/dL    HDL 78 (H) 40 - 75 mg/dL    LDL Cholesterol 104.2 63.0 - 159.0 mg/dL    HDL/Cholesterol Ratio 39.2 20.0 - 50.0 %    Total Cholesterol/HDL Ratio 2.6 2.0 - 5.0    Non-HDL Cholesterol 121 mg/dL   PSA, Screening    Collection Time: 01/13/23 12:45 PM   Result Value Ref Range    PSA, Screen 0.35 0.00 - 4.00 ng/mL   HIV 1/2 Ag/Ab (4th Gen)    Collection Time: 01/13/23 12:45 PM   Result Value Ref Range    HIV 1/2 Ag/Ab Non-reactive Non-reactive   Hepatitis C Antibody    Collection Time: 01/13/23 12:45 PM   Result Value Ref Range    Hepatitis C Ab Non-reactive Non-reactive   Testosterone    Collection Time: 01/31/23  1:33 PM   Result Value Ref Range    Testosterone, Total 306 304 - 1227 ng/dL   Complete PFT w/ bronchodilator    Collection Time: 03/13/23  4:04 PM   Result Value Ref Range    Interpretation       Normal spirometry. (FEV1/VC greater than or equal to LLN and FVC greater than or equal to LLN)Airflow is not clearly improved after bronchodilator. Clinical improvement following bronchodilator therapy may occur in the absence of spirometric improvement.     Â Normal lung volumes.(TLC> equal to 80% of predicted and RV % predicted is >80%)The diffusing capacity for carbon monoxide is normal (unadjusted for hemoglobin). This interpretation of diffusing capacity does not take into account the patient's   hemoglobin level (Unavailable at the time of testing - hemoglobin assumed to be normal). Flow volume loop demonstrate an obstructive defect.Pattern of airway obstruction, and normal or increased diffusion suggests asthma or chronic bronchitis. Clinical   correlation suggested.      Post FVC 4.44 3.28 - 5.11 L    Post FEV1 3.45 2.62 - 4.06 L    Post  FEV1 FVC 77.71 69.19 - 89.57 %    Post FEF 25 75 2.76 1.75 - 5.10 L/s    Post PEF 9.47 6.79 - 10.78 L/s    Post  7.13 sec    Pre DLCO 30.14 20.18 - 34.04 ml/(min*mmHg)    DLCOVA PRE 4.82 3.02 - 5.85 ml/(min*mmHg*L)    VA PRE 6.25 (H) 5.96 - 5.96 L    IVC PRE 4.41 3.28 - 5.11 L    Pre TLC 6.71 4.96 - 7.26 L    VC PRE 4.48 3.28 - 5.11 L    Pre FRC PL 3.69 2.23 - 4.20 L    Pre ERV 1.46 -48012.80 - 34598.20 L    Pre RV 2.23 1.34 - 2.69 L    RVTLC PRE 33.19 24.09 - 42.05 %    Raw PRE 0.15 (L) 3.06 - 3.06 cmH2O*s/L    Pre FVC 4.23 3.28 - 5.11 L    Pre FEV1 3.19 2.62 - 4.06 L    Pre FEV1 FVC 75.38 69.19 - 89.57 %    Pre FEF 25 75 2.45 1.75 - 5.10 L/s    Pre PEF 10.28 6.79 - 10.78 L/s    Pre  7.20 sec    Pre .63 (H) 108.40 - 146.66 L/min    FVC Ref 4.19     FVC LLN 3.28     FVC Pre Ref 100.9 %    FVC Post Ref 106.0 %    FVC Chg 5.1 %    FEV1 Ref 3.36     FEV1 LLN 2.62     FEV1 Pre Ref 94.9 %    FEV1 Post Ref 102.9 %    FEV1 Chg 8.4 %    FEV1 FVC Ref 80     FEV1 FVC LLN 69     FEV1 FVC Pre Ref 94.0 %    FEV1 FVC Post Ref 96.9 %    FEV1 FVC Chg 3.1 %    FEF 25 75 Ref 3.20     FEF 25 75 LLN 1.75     FEF 25 75 Pre Ref 76.4 %    FEF 25 75 Post Ref 86.3 %    FEF 25 75 Chg 13.0 %    PEF Ref 8.79     PEF LLN 6.79     PEF Pre Ref 117.0 %    PEF Post Ref 107.8 %    PEF Chg -7.9 %    JNH334 Chg -1.1 %    MVV Ref 128     MVV      MVV Pre Ref 118.1 %    TLC Ref 6.11     TLC LLN 4.96     TLC Pre Ref 109.7 %    VC Ref 4.19     VC LLN 3.28     VC Pre Ref 106.9 %    FRCpleth Ref 3.21     FRCpleth LLN 2.23     FRCpleth PreRef 114.8 %    ERV Ref 1.20     ERV LLN -61367.80     ERV Pre Ref 121.6 %    RV Ref 2.01     RV LLN 1.34     RV Pre Ref 110.7 %    RVTLC Ref 33     RVTLC LLN 24     RVTLC Pre Ref 100.4 %    Raw Ref 3.06     Raw LLN 3.06     Raw Pre Ref 4.9 %    DLCO Single Breath Ref 27.11     DLCO Single Breath LLN 20.18     DLCO Single Breath Pre Ref 111.2 %    DLCOc Single Breath Ref 27.11     DLCOc Single  Breath LLN 20.18     DLCOVA Ref 4.44     DLCOVA LLN 3.02     DLCOVA Pre Ref 108.7 %    DLCOc SBVA Ref 4.44     DLCOc SBVA LLN 3.02     VA Single Breath Ref 5.96     VA Single Breath LLN 5.96     VA Single Breath Pre Ref 104.8 %    IVC Single Breath Ref 4.19     IVC Single Breath LLN 3.28     IVC Single Breath Pre Ref 105.2 %         Assessment    1. Acute maxillary sinusitis, recurrence not specified    2. URI with cough and congestion    3. Attention deficit hyperactivity disorder (ADHD), unspecified ADHD type          Skinny King was seen today for sinus problem.    Diagnoses and all orders for this visit:    Acute maxillary sinusitis, recurrence not specified  -     betamethasone acetate-betamethasone sodium phosphate injection 9 mg  -     predniSONE (DELTASONE) 20 MG tablet; Take 2 tablets (40 mg total) by mouth once daily. for 5 days  -     amoxicillin-clavulanate 875-125mg (AUGMENTIN) 875-125 mg per tablet; Take 1 tablet by mouth every 12 (twelve) hours. for 7 days    URI with cough and congestion  -     betamethasone acetate-betamethasone sodium phosphate injection 9 mg  -     predniSONE (DELTASONE) 20 MG tablet; Take 2 tablets (40 mg total) by mouth once daily. for 5 days  -     promethazine-dextromethorphan (PROMETHAZINE-DM) 6.25-15 mg/5 mL Syrp; Take 5 mLs by mouth every 6 (six) hours as needed (cough).    Attention deficit hyperactivity disorder (ADHD), unspecified ADHD type  -     dextroamphetamine-amphetamine (ADDERALL) 10 mg Tab; Take 1 tablet (10 mg total) by mouth 2 (two) times a day.  -     dextroamphetamine-amphetamine (ADDERALL) 10 mg Tab; Take 1 tablet (10 mg total) by mouth 2 (two) times a day.  -     dextroamphetamine-amphetamine (ADDERALL) 10 mg Tab; Take 1 tablet (10 mg total) by mouth 2 (two) times a day.      With symptoms worsening, will do Augmentin for sinuses.  Preeti on injection today.  Start prednisone tablets tomorrow.  Rest, fluids, Tylenol/ibuprofen, prescription cough  medicine.  Recommended Mucinex to help with drainage.      Will refill Adderall today say does not have to come back next week.    FOLLOW-UP:  Follow up in about 3 months (around 7/5/2023) for med refill.    I spent a total of 35 minutes face to face and non-face to face on the date of this visit.This includes time preparing to see the patient (eg, review of tests, notes), obtaining and/or reviewing additional history from an independent historian and/or outside medical records, documenting clinical information in the electronic health record, independently interpreting results and/or communicating results to the patient/family/caregiver, or care coordinator.    Signed by:  Slim Ortiz MD

## 2023-04-05 NOTE — TELEPHONE ENCOUNTER
----- Message from Milena Morton sent at 4/5/2023  7:34 AM CDT -----  Contact: Pt    Type:  Same Day Appointment Request    Caller is requesting a same day appointment.  Caller declined first available appointment listed below.    Name of Caller:pt   When is the first available appointment? 4/06  Symptoms: sinus infection   Best Call Back Number:[phone]  Additional Information: appt was boooked out on yesterday

## 2023-04-05 NOTE — PROGRESS NOTES
After obtaining consent, per orders from Dr Ortiz, celestone 9mg injection given by myself. Pt instructed to wait 15 min afterwards and to report any adverse reaction/feelings immediately.    Pt declined to wait stating he had a busy day and has had this shot numerous times.

## 2023-04-05 NOTE — PATIENT INSTRUCTIONS
"Preeti on injection today.    If needed, start prednisone tablets tomorrow.      Take all antibiotics, as directed.      Rest  Stay hydrated, drink plenty of fluids   Use OTC nasal saline spray (Ocenn's, AYR, [employer],etc.) to clear nasal drainage and help with nasal congestion  Use an OTC antihistamine (Zyrtec or Claritin) to help dry mucus and post nasal drip  Use OTC Mucinex (plain blue box, no "letters") for sinus/chest congestion  Gargle with warm salt water for throat comfort (10-15 seconds, do NOT swallow!)  Use OTC zinc lozenges or OTC Cepacol lozenges (with benzocaine) for sore throat/cough  Alternate OTC Tylenol and/or Ibuprofen for fever, headache and body aches     Separately, I sent a refill of your Adderall to the pharmacy.  "

## 2023-04-18 ENCOUNTER — LAB VISIT (OUTPATIENT)
Dept: LAB | Facility: HOSPITAL | Age: 48
End: 2023-04-18
Attending: ALLERGY & IMMUNOLOGY
Payer: COMMERCIAL

## 2023-04-18 ENCOUNTER — CLINICAL SUPPORT (OUTPATIENT)
Dept: PULMONOLOGY | Facility: CLINIC | Age: 48
End: 2023-04-18
Payer: COMMERCIAL

## 2023-04-18 ENCOUNTER — OFFICE VISIT (OUTPATIENT)
Dept: ALLERGY | Facility: CLINIC | Age: 48
End: 2023-04-18
Payer: COMMERCIAL

## 2023-04-18 VITALS
BODY MASS INDEX: 30.93 KG/M2 | WEIGHT: 185.63 LBS | HEIGHT: 65 IN | HEART RATE: 61 BPM | DIASTOLIC BLOOD PRESSURE: 92 MMHG | SYSTOLIC BLOOD PRESSURE: 141 MMHG | TEMPERATURE: 98 F

## 2023-04-18 DIAGNOSIS — J45.50 SEVERE PERSISTENT ASTHMA WITHOUT COMPLICATION: ICD-10-CM

## 2023-04-18 DIAGNOSIS — J32.9 RECURRENT SINUS INFECTIONS: ICD-10-CM

## 2023-04-18 DIAGNOSIS — J30.1 SEASONAL ALLERGIC RHINITIS DUE TO POLLEN: ICD-10-CM

## 2023-04-18 DIAGNOSIS — R76.8 ELEVATED IGE LEVEL: ICD-10-CM

## 2023-04-18 DIAGNOSIS — J45.50 SEVERE PERSISTENT ASTHMA WITHOUT COMPLICATION: Primary | ICD-10-CM

## 2023-04-18 LAB
BASOPHILS # BLD AUTO: 0.06 K/UL (ref 0–0.2)
BASOPHILS NFR BLD: 1 % (ref 0–1.9)
DIFFERENTIAL METHOD: ABNORMAL
EOSINOPHIL # BLD AUTO: 0.1 K/UL (ref 0–0.5)
EOSINOPHIL NFR BLD: 1.8 % (ref 0–8)
ERYTHROCYTE [DISTWIDTH] IN BLOOD BY AUTOMATED COUNT: 12.9 % (ref 11.5–14.5)
HCT VFR BLD AUTO: 47.6 % (ref 40–54)
HGB BLD-MCNC: 15.2 G/DL (ref 14–18)
IGE SERPL-ACNC: 75 IU/ML (ref 0–100)
IMM GRANULOCYTES # BLD AUTO: 0.13 K/UL (ref 0–0.04)
IMM GRANULOCYTES NFR BLD AUTO: 2.1 % (ref 0–0.5)
LYMPHOCYTES # BLD AUTO: 1.6 K/UL (ref 1–4.8)
LYMPHOCYTES NFR BLD: 25.2 % (ref 18–48)
MCH RBC QN AUTO: 30.2 PG (ref 27–31)
MCHC RBC AUTO-ENTMCNC: 31.9 G/DL (ref 32–36)
MCV RBC AUTO: 94 FL (ref 82–98)
MONOCYTES # BLD AUTO: 0.7 K/UL (ref 0.3–1)
MONOCYTES NFR BLD: 10.7 % (ref 4–15)
NEUTROPHILS # BLD AUTO: 3.7 K/UL (ref 1.8–7.7)
NEUTROPHILS NFR BLD: 59.2 % (ref 38–73)
NRBC BLD-RTO: 0 /100 WBC
PLATELET # BLD AUTO: 408 K/UL (ref 150–450)
PMV BLD AUTO: 9.6 FL (ref 9.2–12.9)
RBC # BLD AUTO: 5.04 M/UL (ref 4.6–6.2)
WBC # BLD AUTO: 6.26 K/UL (ref 3.9–12.7)

## 2023-04-18 PROCEDURE — 3077F SYST BP >= 140 MM HG: CPT | Mod: CPTII,S$GLB,, | Performed by: ALLERGY & IMMUNOLOGY

## 2023-04-18 PROCEDURE — 95012 NITRIC OXIDE EXP GAS DETER: CPT | Mod: S$GLB,,, | Performed by: INTERNAL MEDICINE

## 2023-04-18 PROCEDURE — 99999 PR PBB SHADOW E&M-EST. PATIENT-LVL IV: CPT | Mod: PBBFAC,,, | Performed by: ALLERGY & IMMUNOLOGY

## 2023-04-18 PROCEDURE — 3077F PR MOST RECENT SYSTOLIC BLOOD PRESSURE >= 140 MM HG: ICD-10-PCS | Mod: CPTII,S$GLB,, | Performed by: ALLERGY & IMMUNOLOGY

## 2023-04-18 PROCEDURE — 99999 PR PBB SHADOW E&M-EST. PATIENT-LVL IV: ICD-10-PCS | Mod: PBBFAC,,, | Performed by: ALLERGY & IMMUNOLOGY

## 2023-04-18 PROCEDURE — 82785 ASSAY OF IGE: CPT | Performed by: ALLERGY & IMMUNOLOGY

## 2023-04-18 PROCEDURE — 36415 COLL VENOUS BLD VENIPUNCTURE: CPT | Performed by: ALLERGY & IMMUNOLOGY

## 2023-04-18 PROCEDURE — 4010F PR ACE/ARB THEARPY RXD/TAKEN: ICD-10-PCS | Mod: CPTII,S$GLB,, | Performed by: ALLERGY & IMMUNOLOGY

## 2023-04-18 PROCEDURE — 3008F BODY MASS INDEX DOCD: CPT | Mod: CPTII,S$GLB,, | Performed by: ALLERGY & IMMUNOLOGY

## 2023-04-18 PROCEDURE — 3080F PR MOST RECENT DIASTOLIC BLOOD PRESSURE >= 90 MM HG: ICD-10-PCS | Mod: CPTII,S$GLB,, | Performed by: ALLERGY & IMMUNOLOGY

## 2023-04-18 PROCEDURE — 86317 IMMUNOASSAY INFECTIOUS AGENT: CPT | Performed by: ALLERGY & IMMUNOLOGY

## 2023-04-18 PROCEDURE — 86003 ALLG SPEC IGE CRUDE XTRC EA: CPT | Performed by: ALLERGY & IMMUNOLOGY

## 2023-04-18 PROCEDURE — 1159F MED LIST DOCD IN RCRD: CPT | Mod: CPTII,S$GLB,, | Performed by: ALLERGY & IMMUNOLOGY

## 2023-04-18 PROCEDURE — 85025 COMPLETE CBC W/AUTO DIFF WBC: CPT | Performed by: ALLERGY & IMMUNOLOGY

## 2023-04-18 PROCEDURE — 3008F PR BODY MASS INDEX (BMI) DOCUMENTED: ICD-10-PCS | Mod: CPTII,S$GLB,, | Performed by: ALLERGY & IMMUNOLOGY

## 2023-04-18 PROCEDURE — 99204 OFFICE O/P NEW MOD 45 MIN: CPT | Mod: S$GLB,,, | Performed by: ALLERGY & IMMUNOLOGY

## 2023-04-18 PROCEDURE — 99999 PR PBB SHADOW E&M-EST. PATIENT-LVL I: ICD-10-PCS | Mod: PBBFAC,,,

## 2023-04-18 PROCEDURE — 86003 ALLG SPEC IGE CRUDE XTRC EA: CPT | Mod: 59 | Performed by: ALLERGY & IMMUNOLOGY

## 2023-04-18 PROCEDURE — 3080F DIAST BP >= 90 MM HG: CPT | Mod: CPTII,S$GLB,, | Performed by: ALLERGY & IMMUNOLOGY

## 2023-04-18 PROCEDURE — 1159F PR MEDICATION LIST DOCUMENTED IN MEDICAL RECORD: ICD-10-PCS | Mod: CPTII,S$GLB,, | Performed by: ALLERGY & IMMUNOLOGY

## 2023-04-18 PROCEDURE — 4010F ACE/ARB THERAPY RXD/TAKEN: CPT | Mod: CPTII,S$GLB,, | Performed by: ALLERGY & IMMUNOLOGY

## 2023-04-18 PROCEDURE — 99999 PR PBB SHADOW E&M-EST. PATIENT-LVL I: CPT | Mod: PBBFAC,,,

## 2023-04-18 PROCEDURE — 95012 PR NITRIC OXIDE EXPIRED GAS DETERMINATION: ICD-10-PCS | Mod: S$GLB,,, | Performed by: INTERNAL MEDICINE

## 2023-04-18 PROCEDURE — 99204 PR OFFICE/OUTPT VISIT, NEW, LEVL IV, 45-59 MIN: ICD-10-PCS | Mod: S$GLB,,, | Performed by: ALLERGY & IMMUNOLOGY

## 2023-04-18 RX ORDER — MONTELUKAST SODIUM 10 MG/1
10 TABLET ORAL NIGHTLY
Qty: 30 TABLET | Refills: 5 | Status: SHIPPED | OUTPATIENT
Start: 2023-04-18 | End: 2023-05-18

## 2023-04-18 RX ORDER — MONTELUKAST SODIUM 10 MG/1
10 TABLET ORAL NIGHTLY
Qty: 30 TABLET | Refills: 5 | Status: SHIPPED | OUTPATIENT
Start: 2023-04-18 | End: 2023-04-18 | Stop reason: SDUPTHER

## 2023-04-18 NOTE — PROGRESS NOTES
Subjective:      Patient ID: Fernando Hoff is a 48 y.o. male.      Referred by Esther Goss NP    Chief Complaint:  Allergies (NP, here for allergy eval.)      HPI: 48 year old male with a history of asthma, elevated IgE, and allergic rhinitis referred by Esther Goss.      Asthma  On Dupixent- restarted in March- Was on it in the past and received significant relief with Dupixent  Deviated septum repair  Moved here six months ago.  Albuterol- 2-3 times a month        Allergic Rhinitis- has been on SCIT in the past twice- once as a child and once in his 20's in [place]    Sneezing, nasal congestion  Sudafed, Claritin  Flonase in the past- makes it worse with nasal drainage  No nasal polyps  3-4 sinus infections a year  He denies itchy or watery eyes.      NO food allergies  NO drug allergies  NO insect sting allergy      Past Medical History:   Diagnosis Date    Hypertension     Low testosterone         Family History   Problem Relation Age of Onset    Hypertension Mother     Thyroid disease Mother     Heart disease Father     Hypertension Father     Allergies Father         Current Outpatient Medications on File Prior to Visit   Medication Sig Dispense Refill    albuterol (VENTOLIN HFA) 90 mcg/actuation inhaler Inhale 2 puffs into the lungs every 6 (six) hours as needed for Wheezing or Shortness of Breath. Rescue inhaler. 18 g 3    dextroamphetamine-amphetamine (ADDERALL) 10 mg Tab Take 1 tablet (10 mg total) by mouth 2 (two) times a day. 60 tablet 0    dextroamphetamine-amphetamine (ADDERALL) 10 mg Tab Take 1 tablet (10 mg total) by mouth 2 (two) times a day. 60 tablet 0    [START ON 5/5/2023] dextroamphetamine-amphetamine (ADDERALL) 10 mg Tab Take 1 tablet (10 mg total) by mouth 2 (two) times a day. 60 tablet 0    [START ON 6/4/2023] dextroamphetamine-amphetamine (ADDERALL) 10 mg Tab Take 1 tablet (10 mg total) by mouth 2 (two) times a day. 60 tablet 0    dupilumab (DUPIXENT SYRINGE) 300 mg/2 mL Syrg  Inject 2 mLs (300 mg total) into the skin every 14 (fourteen) days. 12 mL 3    fluticasone propionate (FLONASE) 50 mcg/actuation nasal spray fluticasone propionate 50 mcg/actuation nasal spray,suspension   USE 2 SPRAYS IN EACH NOSTRIL EVERY DAY AS NEEDED FOR CONGESTION      fluticasone-umeclidin-vilanter (TRELEGY ELLIPTA) 100-62.5-25 mcg DsDv Inhale 1 puff into the lungs once daily. 60 each 5    losartan-hydrochlorothiazide 100-25 mg (HYZAAR) 100-25 mg per tablet Take 1 tablet by mouth once daily. 90 tablet 3    testosterone enanthate (XYOSTED) 75 mg/0.5 mL AtIn Inject 75 mg into the skin once a week. 4 each 5    hydroCHLOROthiazide (HYDRODIURIL) 25 MG tablet Take 25 mg by mouth.       No current facility-administered medications on file prior to visit.        Review of patient's allergies indicates:  No Known Allergies.          Environmental History: Pets in the home: dogs (1).  Tobacco Smoke in Home: no  Review of Systems   Constitutional:  Negative for chills and fever.   HENT:  Positive for congestion, rhinorrhea and sneezing.    Eyes:  Negative for discharge and itching.   Respiratory:  Positive for shortness of breath. Negative for cough and wheezing.    Cardiovascular:  Negative for chest pain and leg swelling.   Gastrointestinal:  Negative for nausea and vomiting.   Skin:  Negative for rash and wound.   Allergic/Immunologic: Positive for environmental allergies. Negative for food allergies and immunocompromised state.   Neurological:  Negative for facial asymmetry and speech difficulty.   Hematological:  Negative for adenopathy. Does not bruise/bleed easily.   Psychiatric/Behavioral:  Negative for behavioral problems and suicidal ideas.      Objective:   Physical Exam  Constitutional:       General: He is not in acute distress.     Appearance: Normal appearance. He is not ill-appearing or toxic-appearing.   HENT:      Head: Normocephalic and atraumatic.      Right Ear: Tympanic membrane, ear canal and  external ear normal. There is no impacted cerumen.      Left Ear: Tympanic membrane, ear canal and external ear normal. There is no impacted cerumen.      Nose: Nose normal. No congestion or rhinorrhea.      Mouth/Throat:      Mouth: Mucous membranes are moist.      Pharynx: No oropharyngeal exudate or posterior oropharyngeal erythema.   Eyes:      General: No scleral icterus.        Right eye: No discharge.         Left eye: No discharge.      Pupils: Pupils are equal, round, and reactive to light.   Neck:      Thyroid: No thyromegaly.   Cardiovascular:      Rate and Rhythm: Normal rate and regular rhythm.      Heart sounds: Normal heart sounds. No murmur heard.    No friction rub. No gallop.   Pulmonary:      Effort: Pulmonary effort is normal. No respiratory distress.      Breath sounds: Normal breath sounds. No stridor. No wheezing, rhonchi or rales.   Chest:      Chest wall: No tenderness.   Musculoskeletal:         General: Normal range of motion.      Cervical back: Normal range of motion and neck supple. No rigidity or tenderness.   Lymphadenopathy:      Cervical: No cervical adenopathy.   Skin:     General: Skin is warm and dry.      Coloration: Skin is not jaundiced.      Findings: No bruising or rash.   Neurological:      General: No focal deficit present.      Mental Status: He is alert and oriented to person, place, and time.      Gait: Gait normal.   Psychiatric:         Mood and Affect: Mood normal.         Behavior: Behavior normal.         Thought Content: Thought content normal.         Judgment: Judgment normal.       Unable to skin prick test, as he is taking antihistamines  Assessment:      1. Severe persistent asthma without complication    2. Elevated IgE level    3. Seasonal allergic rhinitis due to pollen    4. Recurrent sinus infections        Plan:     Severe persistent asthma without complication  -     Ambulatory referral/consult to Allergy  -     CBC Auto Differential; Future; Expected  date: 04/18/2023  -     montelukast (SINGULAIR) 10 mg tablet; Take 1 tablet (10 mg total) by mouth every evening.  Dispense: 30 tablet; Refill: 5    Elevated IgE level    Seasonal allergic rhinitis due to pollen  -     Allergen, Pecan Tree IgE; Future; Expected date: 04/18/2023  -     [place], black IgE; Future; Expected date: 04/18/2023  -     [place], bald IgE; Future; Expected date: 04/18/2023  -     Oak, white IgE; Future; Expected date: 04/18/2023  -     Allergen, Cocklebur; Future; Expected date: 04/18/2023  -     Cat epithelium IgE; Future; Expected date: 04/18/2023  -     IgE; Future; Expected date: 04/18/2023  -     [place] grass IgE; Future; Expected date: 04/18/2023  -     Aspergillus fumagatus IgE; Future; Expected date: 04/18/2023  -     Chaetomium globosum IgE; Future; Expected date: 04/18/2023  -     Cockroach, American IgE; Future; Expected date: 04/18/2023  -     Cladosporium IgE; Future; Expected date: 04/18/2023  -     Curvularia lunata IgE; Future; Expected date: 04/18/2023  -     D. farinae IgE; Future; Expected date: 04/18/2023  -     D. pteronyssinus IgE; Future; Expected date: 04/18/2023  -     Dog dander IgE; Future; Expected date: 04/18/2023  -     Plantain, English IgE; Future; Expected date: 04/18/2023  -     Eucalyptus IgE; Future; Expected date: 04/18/2023  -     Givens elder, rough IgE; Future; Expected date: 04/18/2023  -     Mugwort IgE; Future; Expected date: 04/18/2023  -     Nettle IgE; Future; Expected date: 04/18/2023  -     Orchard grass IgE; Future; Expected date: 04/18/2023  -     [place], western white IgE; Future; Expected date: 04/18/2023  -     Privet, common IgE; Future; Expected date: 04/18/2023  -     Ragweed, short, common IgE; Future; Expected date: 04/18/2023  -     Red top grass IgE; Future; Expected date: 04/18/2023  -     Rye grass, cultivated IgE; Future; Expected date: 04/18/2023  -     Thistle, Russian IgE; Future; Expected date: 04/18/2023  -     Stemphyllium IgE;  Future; Expected date: 04/18/2023  -     Ellis IgE; Future; Expected date: 04/18/2023  -     Facundo grass IgE; Future; Expected date: 04/18/2023  -     Allergen, Hackberry Celtis; Future; Expected date: 04/18/2023  -     Allergen, Elm Cedar; Future; Expected date: 04/18/2023  -     Allergen-Loyal; Future; Expected date: 04/18/2023  -     RAST Allergen for [place]; Future; Expected date: 04/18/2023  -     RAST Allergen Maple ([place]); Future; Expected date: 04/18/2023  -     Allergen, Meadow Grass ([employer] Blue); Future; Expected date: 04/18/2023  -     Allergen-Silver Birch; Future; Expected date: 04/18/2023  -     RAST Allergen [place]; Future; Expected date: 04/18/2023  -     RAST Allergen, Sheep [place]); Future; Expected date: 04/18/2023  -     Allergen-Alternaria Alternata; Future; Expected date: 04/18/2023  -     Allergen-Maple [place]; Future; Expected date: 04/18/2023  -     Allergen, [place]; Future; Expected date: 04/18/2023    Recurrent sinus infections  -     Streptococcus pneumoniae IgG Antibody (23 Serotypes), MAID; Future; Expected date: 04/18/2023     Does not qualify for Nucala with current CBC, checking labs. He did have prednisone 3 weeks ago.   Starting Singulair.  Continue current medications.  Checking strep pneumococcal titers.  Continue Dupxient (started last month, has previously taken for a year with a decrease in asthma exacerbations).  Advised that decongestants can increase blood pressure.  RTC 6 weeks or sooner, if needed     BIJU RODRIGES spent a total of 46 minutes on the day of the visit.  This includes face to face time and non-face to face time preparing to see the patient (eg, review of tests), obtaining and/or reviewing separately obtained history, documenting clinical information in the electronic or other health record, independently interpreting results and communicating results to the patient/family/caregiver, or care coordinator.      CC: Esther Goss, NP

## 2023-04-18 NOTE — PROCEDURES
Clinical Guide to Interpretation or FeNO Levels :    FeNO  (ppb) LOW INTERMEDIATE HIGH   ADULT VALUES < 25 25-50          > 50   Th2-driven Inflammation Unlikely Likely Significant     Patients FeNO level at this visit : __11__ (ppb)    Interpretation of FeNO measurement in adults:  [x] FENO is less than 25 ppb implies non eosinophilic airway inflammation or the absence of airway inflammation.    Comment: Low FENO (<25 ppb) in adult asthmatics with persistent symptoms suggests other etiologies for these symptoms and a lower likelihood of benefit from adding or increasing inhaled glucocorticoids.    [] FENO between 25 and 50 ppb in adults should be interpreted cautiously with reference to the clinical situation (eg, symptomatic, on or off therapy, current smoking).    [] FENO greater than 50 ppb in adults  suggests eosinophilic airway inflammation   Comment: High FENO (>50 ppb) in adult asthmatics even with atypical symptoms suggests glucocorticoid responsiveness. High FENO (>50 ppb) can help identify poor adherence or uncontrolled inflammation in asthma patients with otherwise seemingly controlled asthma.    Discussion:  A FENO less than 25 ppb in adults and less than 20 ppb in children younger than 12 years of age implies noneosinophilic airway inflammation or the absence of airway inflammation.  A FENO greater than 50 ppb in adults or greater than 35 ppb in children suggests eosinophilic airway inflammation.   Values of FENO between 25 and 50 ppb in adults (20 to 35 ppb in children) should be interpreted cautiously with reference to the clinical situation (eg, symptomatic, on or off therapy, current smoking).  A rising FENO with a greater than 20 percent change and more than 25 ppb (20 ppb in children) from a previously stable level suggests increasing eosinophilic airway inflammation, but there are wide inter-individual differences.  A decrease in FENO greater than 20 percent for values over 50 ppb or more than  10 ppb for values less than 50 ppb may be clinically important.  ?FENO in other respiratory diseases - Several other diseases are associated with altered levels of exhaled NO: low levels of FENO have been noted in cystic fibrosis, current smoking, pulmonary hypertension, hypothermia, primary ciliary dyskinesia, and bronchopulmonary dysplasia. Elevated FENO has been noted in atopy, nonasthmatic eosinophilic bronchitis, COPD exacerbations, noncystic fibrosis bronchiectasis, and viral upper respiratory infections.    REFERENCE:  ATS Board of Directors, December 2004, and by the ERS Executive Committee, June 2004. ATS/ERS Recommendations for Standardized Procedures for the Online and Offline Measurement of Exhaled Lower Respiratory Nitric Oxide and Nasal Nitric Oxide. Guideline 2005

## 2023-04-19 LAB — AMER SYCAMORE IGE QN: <0.35 KU/L

## 2023-04-20 LAB
S PNEUM DA 1 IGG SER-MCNC: 9.6 MCG/ML
S PNEUM DA 10A IGG SER-MCNC: 2.4 MCG/ML
S PNEUM DA 11A IGG SER-MCNC: 23.6 MCG/ML
S PNEUM DA 12F IGG SER-MCNC: 14.7 MCG/ML
S PNEUM DA 14 IGG SER-MCNC: 23.2 MCG/ML
S PNEUM DA 15B IGG SER-MCNC: 4.7 MCG/ML
S PNEUM DA 17F IGG SER-MCNC: 4.2 MCG/ML
S PNEUM DA 18C IGG SER-MCNC: 24.5 MCG/ML
S PNEUM DA 19A IGG SER-MCNC: 41.1 MCG/ML
S PNEUM DA 2 IGG SER-MCNC: 6.4 MCG/ML
S PNEUM DA 20A IGG SER-MCNC: 5.9 MCG/ML
S PNEUM DA 22F IGG SER-MCNC: 25.3 MCG/ML
S PNEUM DA 23F IGG SER-MCNC: 27.1 MCG/ML
S PNEUM DA 3 IGG SER-MCNC: 0.7 MCG/ML
S PNEUM DA 33F IGG SER-MCNC: 9.2 MCG/ML
S PNEUM DA 4 IGG SER-MCNC: 1.7 MCG/ML
S PNEUM DA 5 IGG SER-MCNC: 1 MCG/ML
S PNEUM DA 6B IGG SER-MCNC: 6.8 MCG/ML
S PNEUM DA 7F IGG SER-MCNC: 6.5 MCG/ML
S PNEUM DA 8 IGG SER-MCNC: 3.2 MCG/ML
S PNEUM DA 9N IGG SER-MCNC: NORMAL MCG/ML
S PNEUM DA 9V IGG SER-MCNC: 16.9 MCG/ML
S.PNEUMONIAE TYPE 19F: 13.5 MCG/ML

## 2023-04-21 LAB
A ALTERNATA IGE QN: <0.1 KU/L
A FUMIGATUS IGE QN: <0.1 KU/L
ALLERGEN BOXELDER MAPLE TREE IGE: <0.1 KU/L
ALLERGEN CHAETOMIUM GLOBOSUM IGE: <0.1 KU/L
ALLERGEN MAPLE (BOX ELDER) CLASS: NORMAL
ALLERGEN MULBERRY CLASS: NORMAL
ALLERGEN MULBERRY TREE IGE: <0.1 KU/L
ALLERGEN WHITE ASH TREE IGE: <0.1 KU/L
ALLERGEN WHITE PINE TREE IGE: <0.1 KU/L
BAHIA GRASS IGE QN: 0.15 KU/L
BALD CYPRESS IGE QN: <0.1 KU/L
C HERBARUM IGE QN: <0.1 KU/L
C LUNATA IGE QN: <0.1 KU/L
CAT DANDER IGE QN: <0.1 KU/L
CHAETOMIUM GLOB. CLASS: NORMAL
COCKLEBUR IGE QN: <0.1 KU/L
COCKSFOOT IGE QN: <0.1 KU/L
COMMON RAGWEED IGE QN: <0.1 KU/L
COTTONWOOD IGE QN: <0.1 KU/L
D FARINAE IGE QN: 18.5 KU/L
D PTERONYSS IGE QN: 12.8 KU/L
DEPRECATED A ALTERNATA IGE RAST QL: NORMAL
DEPRECATED A FUMIGATUS IGE RAST QL: NORMAL
DEPRECATED BAHIA GRASS IGE RAST QL: ABNORMAL
DEPRECATED BALD CYPRESS IGE RAST QL: NORMAL
DEPRECATED C HERBARUM IGE RAST QL: NORMAL
DEPRECATED C LUNATA IGE RAST QL: NORMAL
DEPRECATED CAT DANDER IGE RAST QL: NORMAL
DEPRECATED COCKLEBUR IGE RAST QL: NORMAL
DEPRECATED COCKSFOOT IGE RAST QL: NORMAL
DEPRECATED COMMON RAGWEED IGE RAST QL: NORMAL
DEPRECATED COTTONWOOD IGE RAST QL: NORMAL
DEPRECATED D FARINAE IGE RAST QL: ABNORMAL
DEPRECATED D PTERONYSS IGE RAST QL: ABNORMAL
DEPRECATED DOG DANDER IGE RAST QL: NORMAL
DEPRECATED ELDER IGE RAST QL: NORMAL
DEPRECATED ENGL PLANTAIN IGE RAST QL: NORMAL
DEPRECATED GUM-TREE IGE RAST QL: NORMAL
DEPRECATED HACKBERRY TREE IGE RAST QL: NORMAL
DEPRECATED JOHNSON GRASS IGE RAST QL: NORMAL
DEPRECATED KENT BLUE GRASS IGE RAST QL: NORMAL
DEPRECATED LONDON PLANE IGE RAST QL: NORMAL
DEPRECATED MUGWORT IGE RAST QL: NORMAL
DEPRECATED NETTLE IGE RAST QL: NORMAL
DEPRECATED PECAN/HICK TREE IGE RAST QL: NORMAL
DEPRECATED PER RYE GRASS IGE RAST QL: NORMAL
DEPRECATED PRIVET IGE RAST QL: NORMAL
DEPRECATED RED TOP GRASS IGE RAST QL: NORMAL
DEPRECATED ROACH IGE RAST QL: ABNORMAL
DEPRECATED SALTWORT IGE RAST QL: NORMAL
DEPRECATED SHEEP SORREL IGE RAST QL: NORMAL
DEPRECATED SILVER BIRCH IGE RAST QL: NORMAL
DEPRECATED TIMOTHY IGE RAST QL: NORMAL
DEPRECATED WHITE OAK IGE RAST QL: NORMAL
DEPRECATED WILLOW IGE RAST QL: NORMAL
DOG DANDER IGE QN: <0.1 KU/L
ELDER IGE QN: <0.1 KU/L
ELM CEDAR CLASS: NORMAL
ELM CEDAR, IGE: <0.1 KU/L
ENGL PLANTAIN IGE QN: <0.1 KU/L
GUM-TREE IGE QN: <0.1 KU/L
HACKBERRY TREE IGE QN: <0.1 KU/L
JOHNSON GRASS IGE QN: <0.1 KU/L
KENT BLUE GRASS IGE QN: 0.1 KU/L
LONDON PLANE IGE QN: <0.1 KU/L
MUGWORT IGE QN: <0.1 KU/L
NETTLE IGE QN: <0.1 KU/L
PECAN/HICK TREE IGE QN: <0.1 KU/L
PER RYE GRASS IGE QN: <0.1 KU/L
PRIVET IGE QN: <0.1 KU/L
RED TOP GRASS IGE QN: <0.1 KU/L
ROACH IGE QN: 0.11 KU/L
SALTWORT IGE QN: <0.1 KU/L
SHEEP SORREL IGE QN: <0.1 KU/L
SILVER BIRCH IGE QN: <0.1 KU/L
STEMPHYLIUM HERBARUM CLASS: NORMAL
STEMPHYLLIUM, IGE: <0.1 KU/L
TIMOTHY IGE QN: <0.1 KU/L
WHITE ASH CLASS: NORMAL
WHITE OAK IGE QN: <0.1 KU/L
WHITE PINE CLASS: NORMAL
WILLOW IGE QN: <0.1 KU/L

## 2023-05-12 ENCOUNTER — OFFICE VISIT (OUTPATIENT)
Dept: INTERNAL MEDICINE | Facility: CLINIC | Age: 48
End: 2023-05-12
Payer: COMMERCIAL

## 2023-05-12 ENCOUNTER — TELEPHONE (OUTPATIENT)
Dept: PRIMARY CARE CLINIC | Facility: CLINIC | Age: 48
End: 2023-05-12

## 2023-05-12 VITALS
OXYGEN SATURATION: 98 % | BODY MASS INDEX: 30.74 KG/M2 | TEMPERATURE: 98 F | SYSTOLIC BLOOD PRESSURE: 104 MMHG | WEIGHT: 184.75 LBS | HEART RATE: 79 BPM | DIASTOLIC BLOOD PRESSURE: 78 MMHG

## 2023-05-12 DIAGNOSIS — Z23 ENCOUNTER FOR IMMUNIZATION: ICD-10-CM

## 2023-05-12 DIAGNOSIS — R82.90 ABNORMAL URINE ODOR: Primary | ICD-10-CM

## 2023-05-12 LAB
BILIRUB SERPL-MCNC: NEGATIVE MG/DL
BLOOD URINE, POC: NORMAL
CLARITY, POC UA: CLEAR
COLOR, POC UA: NORMAL
GLUCOSE UR QL STRIP: NORMAL
KETONES UR QL STRIP: NEGATIVE
LEUKOCYTE ESTERASE URINE, POC: NEGATIVE
NITRITE, POC UA: NEGATIVE
PH, POC UA: 5
PROTEIN, POC: NORMAL
SPECIFIC GRAVITY, POC UA: 1.02
UROBILINOGEN, POC UA: NORMAL

## 2023-05-12 PROCEDURE — 1159F MED LIST DOCD IN RCRD: CPT | Mod: CPTII,S$GLB,, | Performed by: NURSE PRACTITIONER

## 2023-05-12 PROCEDURE — 4010F ACE/ARB THERAPY RXD/TAKEN: CPT | Mod: CPTII,S$GLB,, | Performed by: NURSE PRACTITIONER

## 2023-05-12 PROCEDURE — 90677 PCV20 VACCINE IM: CPT | Mod: S$GLB,,, | Performed by: NURSE PRACTITIONER

## 2023-05-12 PROCEDURE — 3008F BODY MASS INDEX DOCD: CPT | Mod: CPTII,S$GLB,, | Performed by: NURSE PRACTITIONER

## 2023-05-12 PROCEDURE — 99214 PR OFFICE/OUTPT VISIT, EST, LEVL IV, 30-39 MIN: ICD-10-PCS | Mod: 25,S$GLB,, | Performed by: NURSE PRACTITIONER

## 2023-05-12 PROCEDURE — 99214 OFFICE O/P EST MOD 30 MIN: CPT | Mod: 25,S$GLB,, | Performed by: NURSE PRACTITIONER

## 2023-05-12 PROCEDURE — 81002 POCT URINE DIPSTICK WITHOUT MICROSCOPE: ICD-10-PCS | Mod: S$GLB,,, | Performed by: NURSE PRACTITIONER

## 2023-05-12 PROCEDURE — 81002 URINALYSIS NONAUTO W/O SCOPE: CPT | Mod: S$GLB,,, | Performed by: NURSE PRACTITIONER

## 2023-05-12 PROCEDURE — 1159F PR MEDICATION LIST DOCUMENTED IN MEDICAL RECORD: ICD-10-PCS | Mod: CPTII,S$GLB,, | Performed by: NURSE PRACTITIONER

## 2023-05-12 PROCEDURE — 90471 IMMUNIZATION ADMIN: CPT | Mod: S$GLB,,, | Performed by: NURSE PRACTITIONER

## 2023-05-12 PROCEDURE — 4010F PR ACE/ARB THEARPY RXD/TAKEN: ICD-10-PCS | Mod: CPTII,S$GLB,, | Performed by: NURSE PRACTITIONER

## 2023-05-12 PROCEDURE — 99999 PR PBB SHADOW E&M-EST. PATIENT-LVL IV: ICD-10-PCS | Mod: PBBFAC,,, | Performed by: NURSE PRACTITIONER

## 2023-05-12 PROCEDURE — 3008F PR BODY MASS INDEX (BMI) DOCUMENTED: ICD-10-PCS | Mod: CPTII,S$GLB,, | Performed by: NURSE PRACTITIONER

## 2023-05-12 PROCEDURE — 90471 PNEUMOCOCCAL CONJUGATE VACCINE 20-VALENT: ICD-10-PCS | Mod: S$GLB,,, | Performed by: NURSE PRACTITIONER

## 2023-05-12 PROCEDURE — 99999 PR PBB SHADOW E&M-EST. PATIENT-LVL IV: CPT | Mod: PBBFAC,,, | Performed by: NURSE PRACTITIONER

## 2023-05-12 PROCEDURE — 3074F SYST BP LT 130 MM HG: CPT | Mod: CPTII,S$GLB,, | Performed by: NURSE PRACTITIONER

## 2023-05-12 PROCEDURE — 3078F DIAST BP <80 MM HG: CPT | Mod: CPTII,S$GLB,, | Performed by: NURSE PRACTITIONER

## 2023-05-12 PROCEDURE — 3078F PR MOST RECENT DIASTOLIC BLOOD PRESSURE < 80 MM HG: ICD-10-PCS | Mod: CPTII,S$GLB,, | Performed by: NURSE PRACTITIONER

## 2023-05-12 PROCEDURE — 87086 URINE CULTURE/COLONY COUNT: CPT | Performed by: NURSE PRACTITIONER

## 2023-05-12 PROCEDURE — 3074F PR MOST RECENT SYSTOLIC BLOOD PRESSURE < 130 MM HG: ICD-10-PCS | Mod: CPTII,S$GLB,, | Performed by: NURSE PRACTITIONER

## 2023-05-12 PROCEDURE — 87088 URINE BACTERIA CULTURE: CPT | Performed by: NURSE PRACTITIONER

## 2023-05-12 PROCEDURE — 90677 PNEUMOCOCCAL CONJUGATE VACCINE 20-VALENT: ICD-10-PCS | Mod: S$GLB,,, | Performed by: NURSE PRACTITIONER

## 2023-05-12 NOTE — PROGRESS NOTES
Subjective:       Patient ID: Fernando Hoff is a 48 y.o. male.    Chief Complaint: Urinary Tract Infection    Pt presents to clinic today for urine check  New to me, PCP Dr. Ortiz   Reports started this am with urine smelling strong, cloudy   Has a history of UTIs  Denies any dysuria, urinary frequency pelvic pain or pressure  No fever    Request his pneumonia vaccine today       /78   Pulse 79   Temp 97.9 °F (36.6 °C)   Wt 83.8 kg (184 lb 11.9 oz)   SpO2 98%   BMI 30.74 kg/m²     Review of Systems   Constitutional:  Negative for activity change, appetite change, chills, diaphoresis, fatigue, fever and unexpected weight change.   HENT: Negative.     Eyes: Negative.    Respiratory:  Negative for apnea, chest tightness, shortness of breath and stridor.    Cardiovascular:  Negative for chest pain, palpitations and leg swelling.   Gastrointestinal: Negative.    Endocrine: Negative.    Genitourinary:  Positive for frequency. Negative for difficulty urinating, dysuria and flank pain.   Musculoskeletal:  Negative for arthralgias and myalgias.   Skin:  Negative for color change, pallor, rash and wound.   Allergic/Immunologic: Negative.    Neurological:  Negative for dizziness, facial asymmetry, light-headedness and headaches.   Hematological:  Negative for adenopathy.   Psychiatric/Behavioral:  Negative for agitation and behavioral problems.      Objective:      Physical Exam  Vitals and nursing note reviewed.   Constitutional:       General: He is not in acute distress.     Appearance: He is well-developed. He is not diaphoretic.   HENT:      Head: Normocephalic and atraumatic.   Cardiovascular:      Rate and Rhythm: Normal rate and regular rhythm.      Heart sounds: Normal heart sounds.   Pulmonary:      Effort: Pulmonary effort is normal. No respiratory distress.      Breath sounds: Normal breath sounds.   Abdominal:      General: There is no distension.      Tenderness: There is no abdominal tenderness.  There is no right CVA tenderness or left CVA tenderness.   Skin:     General: Skin is warm and dry.      Findings: No rash.   Neurological:      Mental Status: He is alert and oriented to person, place, and time.   Psychiatric:         Behavior: Behavior normal.         Thought Content: Thought content normal.         Judgment: Judgment normal.       Assessment:       1. Abnormal urine odor    2. Encounter for immunization    3. BMI 30.0-30.9,adult        Plan:       Fernando was seen today for urinary tract infection.    Diagnoses and all orders for this visit:    Abnormal urine odor  -     POCT URINE DIPSTICK WITHOUT MICROSCOPE  -     Urine culture    Encounter for immunization  -     (In Office Administered) Pneumococcal Conjugate Vaccine (20 Valent) (IM)    BMI 30.0-30.9,adult      UA normal. Will send for culture  Encouraged pt to increase fluid intake  Will notify pt of results  Pneumonia vaccine given  Follow up for worsening or no improvement in symptoms and PRN.         no weight-bearing restrictions

## 2023-05-12 NOTE — TELEPHONE ENCOUNTER
----- Message from Ericka Loredo sent at 5/12/2023 10:10 AM CDT -----  Contact: [phone]  Type:  Same Day Appointment Request    Caller is requesting a same day appointment.  Caller declined first available appointment listed below.    Name of Caller:Fernando  When is the first available appointment?05/16/2023  Symptoms:Possible UTI  Best Call Back Number:[phone]  Additional Information: Pt is requesting an appointment for later this afternoon due to possible UTI pains

## 2023-05-14 LAB — BACTERIA UR CULT: ABNORMAL

## 2023-06-15 ENCOUNTER — OFFICE VISIT (OUTPATIENT)
Dept: PULMONOLOGY | Facility: CLINIC | Age: 48
End: 2023-06-15
Payer: COMMERCIAL

## 2023-06-15 VITALS
HEART RATE: 64 BPM | HEIGHT: 65 IN | BODY MASS INDEX: 31.07 KG/M2 | OXYGEN SATURATION: 94 % | DIASTOLIC BLOOD PRESSURE: 90 MMHG | RESPIRATION RATE: 16 BRPM | WEIGHT: 186.5 LBS | SYSTOLIC BLOOD PRESSURE: 130 MMHG

## 2023-06-15 DIAGNOSIS — J45.50 SEVERE PERSISTENT ASTHMA, UNSPECIFIED WHETHER COMPLICATED: Primary | ICD-10-CM

## 2023-06-15 DIAGNOSIS — J45.50 SEVERE PERSISTENT ASTHMA WITHOUT COMPLICATION: ICD-10-CM

## 2023-06-15 DIAGNOSIS — R76.8 ELEVATED IGE LEVEL: ICD-10-CM

## 2023-06-15 DIAGNOSIS — J30.89 ENVIRONMENTAL AND SEASONAL ALLERGIES: ICD-10-CM

## 2023-06-15 PROCEDURE — 3080F DIAST BP >= 90 MM HG: CPT | Mod: CPTII,S$GLB,, | Performed by: NURSE PRACTITIONER

## 2023-06-15 PROCEDURE — 99999 PR PBB SHADOW E&M-EST. PATIENT-LVL IV: ICD-10-PCS | Mod: PBBFAC,,, | Performed by: NURSE PRACTITIONER

## 2023-06-15 PROCEDURE — 99214 OFFICE O/P EST MOD 30 MIN: CPT | Mod: S$GLB,,, | Performed by: NURSE PRACTITIONER

## 2023-06-15 PROCEDURE — 3080F PR MOST RECENT DIASTOLIC BLOOD PRESSURE >= 90 MM HG: ICD-10-PCS | Mod: CPTII,S$GLB,, | Performed by: NURSE PRACTITIONER

## 2023-06-15 PROCEDURE — 3008F BODY MASS INDEX DOCD: CPT | Mod: CPTII,S$GLB,, | Performed by: NURSE PRACTITIONER

## 2023-06-15 PROCEDURE — 1159F MED LIST DOCD IN RCRD: CPT | Mod: CPTII,S$GLB,, | Performed by: NURSE PRACTITIONER

## 2023-06-15 PROCEDURE — 4010F PR ACE/ARB THEARPY RXD/TAKEN: ICD-10-PCS | Mod: CPTII,S$GLB,, | Performed by: NURSE PRACTITIONER

## 2023-06-15 PROCEDURE — 1159F PR MEDICATION LIST DOCUMENTED IN MEDICAL RECORD: ICD-10-PCS | Mod: CPTII,S$GLB,, | Performed by: NURSE PRACTITIONER

## 2023-06-15 PROCEDURE — 3075F SYST BP GE 130 - 139MM HG: CPT | Mod: CPTII,S$GLB,, | Performed by: NURSE PRACTITIONER

## 2023-06-15 PROCEDURE — 1160F PR REVIEW ALL MEDS BY PRESCRIBER/CLIN PHARMACIST DOCUMENTED: ICD-10-PCS | Mod: CPTII,S$GLB,, | Performed by: NURSE PRACTITIONER

## 2023-06-15 PROCEDURE — 4010F ACE/ARB THERAPY RXD/TAKEN: CPT | Mod: CPTII,S$GLB,, | Performed by: NURSE PRACTITIONER

## 2023-06-15 PROCEDURE — 99999 PR PBB SHADOW E&M-EST. PATIENT-LVL IV: CPT | Mod: PBBFAC,,, | Performed by: NURSE PRACTITIONER

## 2023-06-15 PROCEDURE — 3075F PR MOST RECENT SYSTOLIC BLOOD PRESS GE 130-139MM HG: ICD-10-PCS | Mod: CPTII,S$GLB,, | Performed by: NURSE PRACTITIONER

## 2023-06-15 PROCEDURE — 99214 PR OFFICE/OUTPT VISIT, EST, LEVL IV, 30-39 MIN: ICD-10-PCS | Mod: S$GLB,,, | Performed by: NURSE PRACTITIONER

## 2023-06-15 PROCEDURE — 1160F RVW MEDS BY RX/DR IN RCRD: CPT | Mod: CPTII,S$GLB,, | Performed by: NURSE PRACTITIONER

## 2023-06-15 PROCEDURE — 3008F PR BODY MASS INDEX (BMI) DOCUMENTED: ICD-10-PCS | Mod: CPTII,S$GLB,, | Performed by: NURSE PRACTITIONER

## 2023-06-15 NOTE — PROGRESS NOTES
"Subjective:      Patient ID: Fernando Hoff is a 48 y.o. male.    Chief Complaint: Asthma    Asthma  Associated symptoms include postnasal drip. His past medical history is significant for asthma.   Patient presents to the office today for asthma.  Patient recently moved from [place] and was out of [place] for few months.  He is now back on Dupixent and Trelegy.  He benefits from Dupxient without recent exacerbation. 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 Not symptom free, but tolerating. Taking Trelegy daily.  Seen by Allergy-positive for Dust mites, cockroach and one grass pollen.  Allergy shots were discussed, but not able to schedule due to his work schedule at this time.  He is taking Singulair.     Patient Active Problem List   Diagnosis    Low testosterone in male    Severe persistent asthma without complication    Elevated IgE level    Environmental and seasonal allergies     BP (!) 130/90   Pulse 64   Resp 16   Ht 5' 5" (1.651 m)   Wt 84.6 kg (186 lb 8.2 oz)   SpO2 (!) 94%   BMI 31.04 kg/m²   Body mass index is 31.04 kg/m².    Review of Systems   Constitutional: Negative.    HENT:  Positive for postnasal drip.    Respiratory: Negative.     Cardiovascular: Negative.    Musculoskeletal: Negative.    Gastrointestinal: Negative.    Neurological: Negative.    Psychiatric/Behavioral: Negative.     Objective:      Physical Exam  Constitutional:       Appearance: Normal appearance. He is well-developed.   HENT:      Head: Normocephalic and atraumatic.      Nose: Nose normal.   Neck:      Thyroid: No thyroid mass or thyromegaly.      Trachea: Trachea normal.   Cardiovascular:      Rate and Rhythm: Normal rate and regular rhythm.      Heart sounds: Normal heart sounds.   Pulmonary:      Effort: Pulmonary effort is normal.      Breath sounds: Normal breath sounds. No wheezing, rhonchi or rales.   Abdominal:      Palpations: There is no splenomegaly. "   Musculoskeletal:         General: Normal range of motion.      Cervical back: Normal range of motion and neck supple.   Skin:     General: Skin is warm and dry.   Neurological:      Mental Status: He is alert and oriented to person, place, and time.   Psychiatric:         Mood and Affect: Mood normal.         Behavior: Behavior normal.     Personal Diagnostic Review  FeNo normal    Assessment:       1. Severe persistent asthma, unspecified whether complicated    2. Environmental and seasonal allergies    3. Elevated IgE level    4. Severe persistent asthma without complication        Outpatient Encounter Medications as of 6/15/2023   Medication Sig Dispense Refill    albuterol (VENTOLIN HFA) 90 mcg/actuation inhaler Inhale 2 puffs into the lungs every 6 (six) hours as needed for Wheezing or Shortness of Breath. Rescue inhaler. 18 g 3    dextroamphetamine-amphetamine (ADDERALL) 10 mg Tab Take 1 tablet (10 mg total) by mouth 2 (two) times a day. 60 tablet 0    dextroamphetamine-amphetamine (ADDERALL) 10 mg Tab Take 1 tablet (10 mg total) by mouth 2 (two) times a day. 60 tablet 0    dextroamphetamine-amphetamine (ADDERALL) 10 mg Tab Take 1 tablet (10 mg total) by mouth 2 (two) times a day. 60 tablet 0    dupilumab (DUPIXENT SYRINGE) 300 mg/2 mL Syrg Inject 2 mLs (300 mg total) into the skin every 14 (fourteen) days. 12 mL 3    fluticasone propionate (FLONASE) 50 mcg/actuation nasal spray fluticasone propionate 50 mcg/actuation nasal spray,suspension   USE 2 SPRAYS IN EACH NOSTRIL EVERY DAY AS NEEDED FOR CONGESTION      fluticasone-umeclidin-vilanter (TRELEGY ELLIPTA) 100-62.5-25 mcg DsDv Inhale 1 puff into the lungs once daily. 60 each 5    hydroCHLOROthiazide (HYDRODIURIL) 25 MG tablet Take 25 mg by mouth.      losartan-hydrochlorothiazide 100-25 mg (HYZAAR) 100-25 mg per tablet Take 1 tablet by mouth once daily. 90 tablet 3    [] montelukast (SINGULAIR) 10 mg tablet Take 1 tablet (10 mg total) by mouth every  evening. 30 tablet 5    testosterone enanthate (XYOSTED) 75 mg/0.5 mL AtIn Inject 75 mg into the skin once a week. 4 each 5     No facility-administered encounter medications on file as of 6/15/2023.     Orders Placed This Encounter   Procedures    Spirometry without Bronchodilator     Standing Status:   Future     Standing Expiration Date:   6/15/2024     Order Specific Question:   Release to patient     Answer:   Immediate     Plan:      Continue current pulmonary drug regimen.  Continue follow up with allergy  Follow up 6 months with spirometry  Problem List Items Addressed This Visit          ENT    Environmental and seasonal allergies       Pulmonary    Severe persistent asthma without complication       Immunology/Multi System    Elevated IgE level     Other Visit Diagnoses       Severe persistent asthma, unspecified whether complicated    -  Primary    Relevant Orders    Spirometry without Bronchodilator                         Elizabeth LeJeune, ACNP, ANP

## 2023-07-08 ENCOUNTER — OFFICE VISIT (OUTPATIENT)
Dept: ALLERGY | Facility: CLINIC | Age: 48
End: 2023-07-08
Payer: COMMERCIAL

## 2023-07-08 VITALS
WEIGHT: 178.13 LBS | BODY MASS INDEX: 29.64 KG/M2 | DIASTOLIC BLOOD PRESSURE: 82 MMHG | HEART RATE: 67 BPM | TEMPERATURE: 98 F | SYSTOLIC BLOOD PRESSURE: 124 MMHG

## 2023-07-08 DIAGNOSIS — J30.89 ALLERGIC RHINITIS DUE TO INSECT: ICD-10-CM

## 2023-07-08 DIAGNOSIS — J30.89 ALLERGIC RHINITIS DUE TO AMERICAN HOUSE DUST MITE: Primary | ICD-10-CM

## 2023-07-08 DIAGNOSIS — J30.1 SEASONAL ALLERGIC RHINITIS DUE TO POLLEN: ICD-10-CM

## 2023-07-08 DIAGNOSIS — J45.40 MODERATE PERSISTENT ASTHMA WITHOUT COMPLICATION: ICD-10-CM

## 2023-07-08 PROCEDURE — 3079F DIAST BP 80-89 MM HG: CPT | Mod: CPTII,S$GLB,, | Performed by: ALLERGY & IMMUNOLOGY

## 2023-07-08 PROCEDURE — 99999 PR PBB SHADOW E&M-EST. PATIENT-LVL III: ICD-10-PCS | Mod: PBBFAC,,, | Performed by: ALLERGY & IMMUNOLOGY

## 2023-07-08 PROCEDURE — 99214 OFFICE O/P EST MOD 30 MIN: CPT | Mod: S$GLB,,, | Performed by: ALLERGY & IMMUNOLOGY

## 2023-07-08 PROCEDURE — 1159F PR MEDICATION LIST DOCUMENTED IN MEDICAL RECORD: ICD-10-PCS | Mod: CPTII,S$GLB,, | Performed by: ALLERGY & IMMUNOLOGY

## 2023-07-08 PROCEDURE — 3008F BODY MASS INDEX DOCD: CPT | Mod: CPTII,S$GLB,, | Performed by: ALLERGY & IMMUNOLOGY

## 2023-07-08 PROCEDURE — 99999 PR PBB SHADOW E&M-EST. PATIENT-LVL III: CPT | Mod: PBBFAC,,, | Performed by: ALLERGY & IMMUNOLOGY

## 2023-07-08 PROCEDURE — 3074F SYST BP LT 130 MM HG: CPT | Mod: CPTII,S$GLB,, | Performed by: ALLERGY & IMMUNOLOGY

## 2023-07-08 PROCEDURE — 4010F ACE/ARB THERAPY RXD/TAKEN: CPT | Mod: CPTII,S$GLB,, | Performed by: ALLERGY & IMMUNOLOGY

## 2023-07-08 PROCEDURE — 3079F PR MOST RECENT DIASTOLIC BLOOD PRESSURE 80-89 MM HG: ICD-10-PCS | Mod: CPTII,S$GLB,, | Performed by: ALLERGY & IMMUNOLOGY

## 2023-07-08 PROCEDURE — 3008F PR BODY MASS INDEX (BMI) DOCUMENTED: ICD-10-PCS | Mod: CPTII,S$GLB,, | Performed by: ALLERGY & IMMUNOLOGY

## 2023-07-08 PROCEDURE — 3074F PR MOST RECENT SYSTOLIC BLOOD PRESSURE < 130 MM HG: ICD-10-PCS | Mod: CPTII,S$GLB,, | Performed by: ALLERGY & IMMUNOLOGY

## 2023-07-08 PROCEDURE — 99214 PR OFFICE/OUTPT VISIT, EST, LEVL IV, 30-39 MIN: ICD-10-PCS | Mod: S$GLB,,, | Performed by: ALLERGY & IMMUNOLOGY

## 2023-07-08 PROCEDURE — 4010F PR ACE/ARB THEARPY RXD/TAKEN: ICD-10-PCS | Mod: CPTII,S$GLB,, | Performed by: ALLERGY & IMMUNOLOGY

## 2023-07-08 PROCEDURE — 1159F MED LIST DOCD IN RCRD: CPT | Mod: CPTII,S$GLB,, | Performed by: ALLERGY & IMMUNOLOGY

## 2023-07-08 RX ORDER — TRIAMCINOLONE ACETONIDE 55 UG/1
2 SPRAY, METERED NASAL DAILY
Qty: 24 G | Refills: 3 | Status: SHIPPED | OUTPATIENT
Start: 2023-07-08

## 2023-07-08 NOTE — PROGRESS NOTES
Subjective:      Patient ID: Fernando Hoff is a 48 y.o. male.        Chief Complaint:  Asthma (Doing well since back on Dupixent every 2 weeks.)      HPI:   7/8/2023: 48 year old male with a history of moderate persistent asthma, allergic rhinitis- dust mite, cockroach and grass pollen- on Duxpient. Here for follow up.    Doing well  Taking 2 benadryl and Singulair  He felt Flonase caused more symptoms.    Asthma  He denies cough, wheeze or shortness of breath.  Requiring albuterol once every two weeks      Allergic rhinitis  He stated that allergy immunotherapy would be difficult due to travel and work schedule.            4/18/2023: 48 year old male with a history of asthma, elevated IgE, and allergic rhinitis referred by Esther Goss.      Asthma  On Dupixent- restarted in March- Was on it in the past and received significant relief with Dupixent  Deviated septum repair  Moved here six months ago.  Albuterol- 2-3 times a month        Allergic Rhinitis- has been on SCIT in the past twice- once as a child and once in his 20's in [place]    Sneezing, nasal congestion  Sudafed, Claritin  Flonase in the past- makes it worse with nasal drainage  No nasal polyps  3-4 sinus infections a year  He denies itchy or watery eyes.      NO food allergies  NO drug allergies  NO insect sting allergy      Past Medical History:   Diagnosis Date    Hypertension     Low testosterone         Family History   Problem Relation Age of Onset    Hypertension Mother     Thyroid disease Mother     Heart disease Father     Hypertension Father     Allergies Father         Current Outpatient Medications on File Prior to Visit   Medication Sig Dispense Refill    albuterol (VENTOLIN HFA) 90 mcg/actuation inhaler Inhale 2 puffs into the lungs every 6 (six) hours as needed for Wheezing or Shortness of Breath. Rescue inhaler. 18 g 3    dextroamphetamine-amphetamine (ADDERALL) 10 mg Tab Take 1 tablet (10 mg total) by mouth 2 (two) times a day. 60  tablet 0    dupilumab (DUPIXENT SYRINGE) 300 mg/2 mL Syrg Inject 2 mLs (300 mg total) into the skin every 14 (fourteen) days. 12 mL 3    fluticasone-umeclidin-vilanter (TRELEGY ELLIPTA) 100-62.5-25 mcg DsDv Inhale 1 puff into the lungs once daily. 60 each 5    hydroCHLOROthiazide (HYDRODIURIL) 25 MG tablet Take 25 mg by mouth.      losartan-hydrochlorothiazide 100-25 mg (HYZAAR) 100-25 mg per tablet Take 1 tablet by mouth once daily. 90 tablet 3    testosterone enanthate (XYOSTED) 75 mg/0.5 mL AtIn Inject 75 mg into the skin once a week. 4 each 5    fluticasone propionate (FLONASE) 50 mcg/actuation nasal spray fluticasone propionate 50 mcg/actuation nasal spray,suspension   USE 2 SPRAYS IN EACH NOSTRIL EVERY DAY AS NEEDED FOR CONGESTION       No current facility-administered medications on file prior to visit.        Review of patient's allergies indicates:  No Known Allergies.          Environmental History: Pets in the home: dogs (1).  Tobacco Smoke in Home: no  Review of Systems   Constitutional:  Negative for chills and fever.   HENT:  Positive for congestion, rhinorrhea and sneezing.    Eyes:  Negative for discharge and itching.   Respiratory:  Positive for shortness of breath. Negative for cough and wheezing.    Cardiovascular:  Negative for chest pain and leg swelling.   Gastrointestinal:  Negative for nausea and vomiting.   Skin:  Negative for rash and wound.   Allergic/Immunologic: Positive for environmental allergies. Negative for food allergies and immunocompromised state.   Neurological:  Negative for facial asymmetry and speech difficulty.   Hematological:  Negative for adenopathy. Does not bruise/bleed easily.   Psychiatric/Behavioral:  Negative for behavioral problems and suicidal ideas.      Objective:   Physical Exam  Constitutional:       General: He is not in acute distress.     Appearance: Normal appearance. He is not ill-appearing or toxic-appearing.   HENT:      Head: Normocephalic and  atraumatic.      Right Ear: Tympanic membrane, ear canal and external ear normal. There is no impacted cerumen.      Left Ear: Tympanic membrane, ear canal and external ear normal. There is no impacted cerumen.      Nose: Nose normal. No congestion or rhinorrhea.      Mouth/Throat:      Mouth: Mucous membranes are moist.      Pharynx: No oropharyngeal exudate or posterior oropharyngeal erythema.   Eyes:      General: No scleral icterus.        Right eye: No discharge.         Left eye: No discharge.      Pupils: Pupils are equal, round, and reactive to light.   Neck:      Thyroid: No thyromegaly.   Cardiovascular:      Rate and Rhythm: Normal rate and regular rhythm.      Heart sounds: Normal heart sounds. No murmur heard.    No friction rub. No gallop.   Pulmonary:      Effort: Pulmonary effort is normal. No respiratory distress.      Breath sounds: Normal breath sounds. No stridor. No wheezing, rhonchi or rales.   Chest:      Chest wall: No tenderness.   Musculoskeletal:         General: Normal range of motion.      Cervical back: Normal range of motion and neck supple. No rigidity or tenderness.   Lymphadenopathy:      Cervical: No cervical adenopathy.   Skin:     General: Skin is warm and dry.      Coloration: Skin is not jaundiced.      Findings: No bruising or rash.   Neurological:      General: No focal deficit present.      Mental Status: He is alert and oriented to person, place, and time.      Gait: Gait normal.   Psychiatric:         Mood and Affect: Mood normal.         Behavior: Behavior normal.         Thought Content: Thought content normal.         Judgment: Judgment normal.       IgE via the blood significant for dust mites, cockroach and grass pollen.    Assessment:      1. Allergic rhinitis due to American house dust mite    2. Seasonal allergic rhinitis due to pollen    3. Allergic rhinitis due to insect    4. Moderate persistent asthma without complication          Plan:     Allergic rhinitis  due to [place] house dust mite  -     triamcinolone (NASACORT) 55 mcg nasal inhaler; 2 sprays by Nasal route once daily.  Dispense: 24 g; Refill: 3    Seasonal allergic rhinitis due to pollen  -     triamcinolone (NASACORT) 55 mcg nasal inhaler; 2 sprays by Nasal route once daily.  Dispense: 24 g; Refill: 3    Allergic rhinitis due to insect  -     triamcinolone (NASACORT) 55 mcg nasal inhaler; 2 sprays by Nasal route once daily.  Dispense: 24 g; Refill: 3    Moderate persistent asthma without complication      Discussed lab results.     Failed Fluticasone. Nasonex ordered.    Continue current medications.      RTC 6 months or sooner, if needed   BIJU RODRIGES spent a total of 32 minutes on the day of the visit.  This includes face to face time and non-face to face time preparing to see the patient (eg, review of tests), obtaining and/or reviewing separately obtained history, documenting clinical information in the electronic or other health record, independently interpreting results and communicating results to the patient/family/caregiver, or care coordinator.

## 2023-07-25 ENCOUNTER — TELEPHONE (OUTPATIENT)
Dept: PRIMARY CARE CLINIC | Facility: CLINIC | Age: 48
End: 2023-07-25
Payer: COMMERCIAL

## 2023-07-25 NOTE — TELEPHONE ENCOUNTER
----- Message from Gilma Mcginnis sent at 7/25/2023 12:26 PM CDT -----  Contact: Fernando  .Type:  Sooner Apoointment Request    Caller is requesting a sooner appointment.  Caller declined first available appointment listed below.  Caller will not accept being placed on the waitlist and is requesting a message be sent to doctor.  Name of Caller:Fernando   When is the first available appointment? 08/15/2023  Symptoms:Sinuses infection/ medication  refill   Would the patient rather a call back or a response via [facility]? call  Best Call Back Number:.[phone]   Additional Information: patient stated if not able get an  appointment can medication be call to pharmacy and tried to refill ADDERALL before ran out but was having regarding shortage   Thanks  [id number] - [place] - [address] AT [facility] [address]  [address]  [place] [address]  Phone: [phone] Fax: [phone]

## 2023-07-26 ENCOUNTER — HOSPITAL ENCOUNTER (OUTPATIENT)
Dept: RADIOLOGY | Facility: HOSPITAL | Age: 48
Discharge: HOME | End: 2023-07-26
Attending: NURSE PRACTITIONER
Payer: COMMERCIAL

## 2023-07-26 ENCOUNTER — OFFICE VISIT (OUTPATIENT)
Dept: INTERNAL MEDICINE | Facility: CLINIC | Age: 48
End: 2023-07-26
Payer: COMMERCIAL

## 2023-07-26 VITALS
WEIGHT: 180.56 LBS | SYSTOLIC BLOOD PRESSURE: 116 MMHG | TEMPERATURE: 97 F | DIASTOLIC BLOOD PRESSURE: 82 MMHG | OXYGEN SATURATION: 98 % | BODY MASS INDEX: 30.08 KG/M2 | HEART RATE: 77 BPM | HEIGHT: 65 IN

## 2023-07-26 DIAGNOSIS — M79.645 PAIN IN FINGER OF LEFT HAND: ICD-10-CM

## 2023-07-26 DIAGNOSIS — J01.90 ACUTE SINUSITIS, RECURRENCE NOT SPECIFIED, UNSPECIFIED LOCATION: Primary | ICD-10-CM

## 2023-07-26 PROCEDURE — 73140 XR FINGER 2 OR MORE VIEWS LEFT: ICD-10-PCS | Mod: 26,LT,, | Performed by: RADIOLOGY

## 2023-07-26 PROCEDURE — 1159F PR MEDICATION LIST DOCUMENTED IN MEDICAL RECORD: ICD-10-PCS | Mod: CPTII,S$GLB,, | Performed by: NURSE PRACTITIONER

## 2023-07-26 PROCEDURE — 4010F PR ACE/ARB THEARPY RXD/TAKEN: ICD-10-PCS | Mod: CPTII,S$GLB,, | Performed by: NURSE PRACTITIONER

## 2023-07-26 PROCEDURE — 3074F SYST BP LT 130 MM HG: CPT | Mod: CPTII,S$GLB,, | Performed by: NURSE PRACTITIONER

## 2023-07-26 PROCEDURE — 3074F PR MOST RECENT SYSTOLIC BLOOD PRESSURE < 130 MM HG: ICD-10-PCS | Mod: CPTII,S$GLB,, | Performed by: NURSE PRACTITIONER

## 2023-07-26 PROCEDURE — 3008F PR BODY MASS INDEX (BMI) DOCUMENTED: ICD-10-PCS | Mod: CPTII,S$GLB,, | Performed by: NURSE PRACTITIONER

## 2023-07-26 PROCEDURE — 3008F BODY MASS INDEX DOCD: CPT | Mod: CPTII,S$GLB,, | Performed by: NURSE PRACTITIONER

## 2023-07-26 PROCEDURE — 3079F DIAST BP 80-89 MM HG: CPT | Mod: CPTII,S$GLB,, | Performed by: NURSE PRACTITIONER

## 2023-07-26 PROCEDURE — 73140 X-RAY EXAM OF FINGER(S): CPT | Mod: 26,LT,, | Performed by: RADIOLOGY

## 2023-07-26 PROCEDURE — 1160F PR REVIEW ALL MEDS BY PRESCRIBER/CLIN PHARMACIST DOCUMENTED: ICD-10-PCS | Mod: CPTII,S$GLB,, | Performed by: NURSE PRACTITIONER

## 2023-07-26 PROCEDURE — 99214 PR OFFICE/OUTPT VISIT, EST, LEVL IV, 30-39 MIN: ICD-10-PCS | Mod: S$GLB,,, | Performed by: NURSE PRACTITIONER

## 2023-07-26 PROCEDURE — 1160F RVW MEDS BY RX/DR IN RCRD: CPT | Mod: CPTII,S$GLB,, | Performed by: NURSE PRACTITIONER

## 2023-07-26 PROCEDURE — 3079F PR MOST RECENT DIASTOLIC BLOOD PRESSURE 80-89 MM HG: ICD-10-PCS | Mod: CPTII,S$GLB,, | Performed by: NURSE PRACTITIONER

## 2023-07-26 PROCEDURE — 1159F MED LIST DOCD IN RCRD: CPT | Mod: CPTII,S$GLB,, | Performed by: NURSE PRACTITIONER

## 2023-07-26 PROCEDURE — 73140 X-RAY EXAM OF FINGER(S): CPT | Mod: TC,FY,PO,LT

## 2023-07-26 PROCEDURE — 99999 PR PBB SHADOW E&M-EST. PATIENT-LVL IV: ICD-10-PCS | Mod: PBBFAC,,, | Performed by: NURSE PRACTITIONER

## 2023-07-26 PROCEDURE — 99214 OFFICE O/P EST MOD 30 MIN: CPT | Mod: S$GLB,,, | Performed by: NURSE PRACTITIONER

## 2023-07-26 PROCEDURE — 99999 PR PBB SHADOW E&M-EST. PATIENT-LVL IV: CPT | Mod: PBBFAC,,, | Performed by: NURSE PRACTITIONER

## 2023-07-26 PROCEDURE — 4010F ACE/ARB THERAPY RXD/TAKEN: CPT | Mod: CPTII,S$GLB,, | Performed by: NURSE PRACTITIONER

## 2023-07-26 RX ORDER — AMOXICILLIN AND CLAVULANATE POTASSIUM 875; 125 MG/1; MG/1
1 TABLET, FILM COATED ORAL EVERY 12 HOURS
Qty: 20 TABLET | Refills: 0 | Status: SHIPPED | OUTPATIENT
Start: 2023-07-26 | End: 2023-08-05

## 2023-07-26 RX ORDER — PREDNISONE 20 MG/1
40 TABLET ORAL DAILY
Qty: 10 TABLET | Refills: 0 | Status: SHIPPED | OUTPATIENT
Start: 2023-07-26 | End: 2023-07-31

## 2023-07-26 NOTE — LETTER
July 26, 2023    Fernando Hoff  [address]  [address]  [place] [address]             [facility]  Internal Medicine  [address] [place] [address]  Phone: [phone]  Fax: [phone]   July 26, 2023     Patient: Fernando Hoff   YOB: 1975   Date of Visit: 7/26/2023       To Whom it May Concern:    Fernando Hoff was seen in my clinic on 7/26/2023. He may return to work on 7/26/2023 .    Please excuse him from any classes or work missed.    If you have any questions or concerns, please don't hesitate to call.    Sincerely,         Norma Recio NP

## 2023-07-26 NOTE — PROGRESS NOTES
"Subjective:       Patient ID: Fernando Hoff is a 48 y.o. male.    Chief Complaint: Sinus Problem (PRESSURE IN HEAD /5 DAYS ), Ear Fullness, Dizziness, Fatigue, and Fever (YESTERDAY )    Pt presents to clinic today for sinus issue  Has been struggling with hsi chronic allergies   Taking allergy meds as prescribed  About 1 week ago started feeling pressure in his head  Ear pain, low grade fever  Dizzy with position changes  No known ill contacts    He is also having some Left hand index finger pain  Feels a pea sized nodule when he pushes down  No known injury that he can remember  Only causes pain when lifting and hits that area       /82   Pulse 77   Temp 97.1 °F (36.2 °C) (Tympanic)   Ht 5' 5" (1.651 m)   Wt 81.9 kg (180 lb 8.9 oz)   SpO2 98%   BMI 30.05 kg/m²     Review of Systems   Constitutional:  Negative for activity change, appetite change, chills, diaphoresis, fatigue, fever and unexpected weight change.   HENT:  Positive for congestion, postnasal drip, rhinorrhea, sinus pressure, sinus pain and sneezing. Negative for dental problem, drooling, ear discharge, ear pain, facial swelling, hearing loss, mouth sores, nosebleeds, sore throat, tinnitus, trouble swallowing and voice change.    Eyes:  Negative for photophobia, pain, discharge, redness, itching and visual disturbance.   Respiratory:  Positive for cough. Negative for apnea, choking, chest tightness, shortness of breath and wheezing.    Cardiovascular:  Negative for chest pain, palpitations and leg swelling.   Gastrointestinal:  Negative for abdominal distention, abdominal pain, anal bleeding, blood in stool, constipation, diarrhea, nausea, rectal pain and vomiting.   Endocrine: Negative.    Genitourinary:  Negative for decreased urine volume, difficulty urinating, dysuria, hematuria and urgency.   Musculoskeletal:  Positive for joint swelling. Negative for arthralgias, gait problem, myalgias, neck pain and neck stiffness.   Skin:  Negative " for color change, pallor, rash and wound.   Allergic/Immunologic: Negative for environmental allergies, food allergies and immunocompromised state.   Neurological:  Negative for dizziness, tremors, seizures, syncope, facial asymmetry, speech difficulty, weakness, light-headedness, numbness and headaches.   Hematological:  Negative for adenopathy. Does not bruise/bleed easily.   Psychiatric/Behavioral:  Negative for agitation, behavioral problems, confusion, decreased concentration, dysphoric mood, hallucinations and sleep disturbance. The patient is not nervous/anxious and is not hyperactive.      Objective:      Physical Exam  Vitals and nursing note reviewed.   Constitutional:       Appearance: He is well-developed.   HENT:      Head: Normocephalic and atraumatic.      Right Ear: Tympanic membrane, ear canal and external ear normal. No decreased hearing noted.      Left Ear: Tympanic membrane, ear canal and external ear normal. No decreased hearing noted.      Nose: Mucosal edema and rhinorrhea present.      Right Sinus: Frontal sinus tenderness present. No maxillary sinus tenderness.      Left Sinus: Frontal sinus tenderness present. No maxillary sinus tenderness.      Mouth/Throat:      Pharynx: Uvula midline. No oropharyngeal exudate or posterior oropharyngeal erythema.      Tonsils: No tonsillar abscesses.   Eyes:      General:         Right eye: No discharge.         Left eye: No discharge.      Conjunctiva/sclera: Conjunctivae normal.   Cardiovascular:      Rate and Rhythm: Normal rate and regular rhythm.      Heart sounds: Normal heart sounds. No murmur heard.  Pulmonary:      Effort: Pulmonary effort is normal. No accessory muscle usage or respiratory distress.      Breath sounds: No decreased breath sounds, wheezing, rhonchi or rales.   Chest:      Chest wall: No tenderness.   Abdominal:      Palpations: Abdomen is soft.      Tenderness: There is no abdominal tenderness.   Musculoskeletal:          General: Normal range of motion.      Left hand: Tenderness present.        Hands:       Cervical back: Normal range of motion.      Comments: Pea sized nodule noted, tender to touch    Skin:     General: Skin is warm and dry.      Findings: No erythema.   Neurological:      Mental Status: He is alert and oriented to person, place, and time.   Psychiatric:         Behavior: Behavior normal.       Assessment:       1. Acute sinusitis, recurrence not specified, unspecified location    2. Pain in finger of left hand    3. BMI 30.0-30.9,adult        Plan:       Fernando was seen today for sinus problem, ear fullness, dizziness, fatigue and fever.    Diagnoses and all orders for this visit:    Acute sinusitis, recurrence not specified, unspecified location  -     predniSONE (DELTASONE) 20 MG tablet; Take 2 tablets (40 mg total) by mouth once daily. for 5 days  -     amoxicillin-clavulanate 875-125mg (AUGMENTIN) 875-125 mg per tablet; Take 1 tablet by mouth every 12 (twelve) hours. for 10 days  - Will treat as sinuses as pt has failed otc treatment and symptomatic on exam    Pain in finger of left hand  -     X-Ray Finger 2 or More Views Left; Future  - Tylenol/motrin PRN     BMI 30.0-30.9,adult      Follow up for worsening or no improvement in symptoms and PRN.

## 2023-07-31 ENCOUNTER — LAB VISIT (OUTPATIENT)
Dept: LAB | Facility: HOSPITAL | Age: 48
End: 2023-07-31
Attending: UROLOGY
Payer: COMMERCIAL

## 2023-07-31 DIAGNOSIS — R79.89 LOW TESTOSTERONE IN MALE: ICD-10-CM

## 2023-07-31 PROCEDURE — 84153 ASSAY OF PSA TOTAL: CPT | Performed by: UROLOGY

## 2023-07-31 PROCEDURE — 85027 COMPLETE CBC AUTOMATED: CPT | Performed by: UROLOGY

## 2023-07-31 PROCEDURE — 80053 COMPREHEN METABOLIC PANEL: CPT | Performed by: UROLOGY

## 2023-07-31 PROCEDURE — 84403 ASSAY OF TOTAL TESTOSTERONE: CPT | Performed by: UROLOGY

## 2023-07-31 PROCEDURE — 36415 COLL VENOUS BLD VENIPUNCTURE: CPT | Mod: PN | Performed by: UROLOGY

## 2023-08-01 LAB
ALBUMIN SERPL BCP-MCNC: 3.9 G/DL (ref 3.5–5.2)
ALP SERPL-CCNC: 85 U/L (ref 55–135)
ALT SERPL W/O P-5'-P-CCNC: 28 U/L (ref 10–44)
ANION GAP SERPL CALC-SCNC: 12 MMOL/L (ref 8–16)
AST SERPL-CCNC: 19 U/L (ref 10–40)
BILIRUB SERPL-MCNC: 0.6 MG/DL (ref 0.1–1)
BUN SERPL-MCNC: 17 MG/DL (ref 6–20)
CALCIUM SERPL-MCNC: 9.6 MG/DL (ref 8.7–10.5)
CHLORIDE SERPL-SCNC: 108 MMOL/L (ref 95–110)
CO2 SERPL-SCNC: 19 MMOL/L (ref 23–29)
COMPLEXED PSA SERPL-MCNC: 0.56 NG/ML (ref 0–4)
CREAT SERPL-MCNC: 1.2 MG/DL (ref 0.5–1.4)
ERYTHROCYTE [DISTWIDTH] IN BLOOD BY AUTOMATED COUNT: 13.3 % (ref 11.5–14.5)
EST. GFR  (NO RACE VARIABLE): >60 ML/MIN/1.73 M^2
GLUCOSE SERPL-MCNC: 151 MG/DL (ref 70–110)
HCT VFR BLD AUTO: 48.9 % (ref 40–54)
HGB BLD-MCNC: 15.9 G/DL (ref 14–18)
MCH RBC QN AUTO: 31.4 PG (ref 27–31)
MCHC RBC AUTO-ENTMCNC: 32.5 G/DL (ref 32–36)
MCV RBC AUTO: 96 FL (ref 82–98)
PLATELET # BLD AUTO: 449 K/UL (ref 150–450)
PMV BLD AUTO: 9.7 FL (ref 9.2–12.9)
POTASSIUM SERPL-SCNC: 3.8 MMOL/L (ref 3.5–5.1)
PROT SERPL-MCNC: 7.7 G/DL (ref 6–8.4)
RBC # BLD AUTO: 5.07 M/UL (ref 4.6–6.2)
SODIUM SERPL-SCNC: 139 MMOL/L (ref 136–145)
TESTOST SERPL-MCNC: 370 NG/DL (ref 304–1227)
WBC # BLD AUTO: 12.92 K/UL (ref 3.9–12.7)

## 2023-08-08 ENCOUNTER — OFFICE VISIT (OUTPATIENT)
Dept: UROLOGY | Facility: CLINIC | Age: 48
End: 2023-08-08
Payer: COMMERCIAL

## 2023-08-08 VITALS
DIASTOLIC BLOOD PRESSURE: 90 MMHG | SYSTOLIC BLOOD PRESSURE: 130 MMHG | BODY MASS INDEX: 29.79 KG/M2 | WEIGHT: 178.81 LBS | HEIGHT: 65 IN

## 2023-08-08 DIAGNOSIS — E34.9 TESTOSTERONE DEFICIENCY: Primary | ICD-10-CM

## 2023-08-08 PROCEDURE — 99999 PR PBB SHADOW E&M-EST. PATIENT-LVL III: ICD-10-PCS | Mod: PBBFAC,,, | Performed by: UROLOGY

## 2023-08-08 PROCEDURE — 3080F DIAST BP >= 90 MM HG: CPT | Mod: CPTII,S$GLB,, | Performed by: UROLOGY

## 2023-08-08 PROCEDURE — 99213 OFFICE O/P EST LOW 20 MIN: CPT | Mod: S$GLB,,, | Performed by: UROLOGY

## 2023-08-08 PROCEDURE — 3080F PR MOST RECENT DIASTOLIC BLOOD PRESSURE >= 90 MM HG: ICD-10-PCS | Mod: CPTII,S$GLB,, | Performed by: UROLOGY

## 2023-08-08 PROCEDURE — 4010F ACE/ARB THERAPY RXD/TAKEN: CPT | Mod: CPTII,S$GLB,, | Performed by: UROLOGY

## 2023-08-08 PROCEDURE — 99213 PR OFFICE/OUTPT VISIT, EST, LEVL III, 20-29 MIN: ICD-10-PCS | Mod: S$GLB,,, | Performed by: UROLOGY

## 2023-08-08 PROCEDURE — 4010F PR ACE/ARB THEARPY RXD/TAKEN: ICD-10-PCS | Mod: CPTII,S$GLB,, | Performed by: UROLOGY

## 2023-08-08 PROCEDURE — 3008F BODY MASS INDEX DOCD: CPT | Mod: CPTII,S$GLB,, | Performed by: UROLOGY

## 2023-08-08 PROCEDURE — 3008F PR BODY MASS INDEX (BMI) DOCUMENTED: ICD-10-PCS | Mod: CPTII,S$GLB,, | Performed by: UROLOGY

## 2023-08-08 PROCEDURE — 1160F RVW MEDS BY RX/DR IN RCRD: CPT | Mod: CPTII,S$GLB,, | Performed by: UROLOGY

## 2023-08-08 PROCEDURE — 1159F PR MEDICATION LIST DOCUMENTED IN MEDICAL RECORD: ICD-10-PCS | Mod: CPTII,S$GLB,, | Performed by: UROLOGY

## 2023-08-08 PROCEDURE — 1159F MED LIST DOCD IN RCRD: CPT | Mod: CPTII,S$GLB,, | Performed by: UROLOGY

## 2023-08-08 PROCEDURE — 3075F PR MOST RECENT SYSTOLIC BLOOD PRESS GE 130-139MM HG: ICD-10-PCS | Mod: CPTII,S$GLB,, | Performed by: UROLOGY

## 2023-08-08 PROCEDURE — 3075F SYST BP GE 130 - 139MM HG: CPT | Mod: CPTII,S$GLB,, | Performed by: UROLOGY

## 2023-08-08 PROCEDURE — 1160F PR REVIEW ALL MEDS BY PRESCRIBER/CLIN PHARMACIST DOCUMENTED: ICD-10-PCS | Mod: CPTII,S$GLB,, | Performed by: UROLOGY

## 2023-08-08 PROCEDURE — 99999 PR PBB SHADOW E&M-EST. PATIENT-LVL III: CPT | Mod: PBBFAC,,, | Performed by: UROLOGY

## 2023-08-08 NOTE — PROGRESS NOTES
CC: follow up Testosterone    History of Present Illness:   Fernando Hoff is a 48 y.o. male here for follow up.     8/8/23-Xyosted works okay. Energy is fair. Libido is okay. No bothersome LUTS.    1/31/23-49yo male, here for evaluation of low T. He reports that he has been Xyosted 75mg weekly for about 2 years. He recently moved from [place]. Prior to that, was on IM testosterone and he had too many peaks and valleys in his levels. Prior to testosterone replacement, he had low energy and low energy. Xyosted has seemed to improve symptoms. No bothersome LUTS.       Review of Systems   Constitutional:  Negative for chills and fever.   Respiratory:  Negative for shortness of breath.    Cardiovascular:  Negative for chest pain and palpitations.   Gastrointestinal:  Negative for abdominal pain.   Genitourinary:  Negative for difficulty urinating.   All other systems reviewed and are negative.        Past Medical History:   Diagnosis Date    Hypertension     Low testosterone        Past Surgical History:   Procedure Laterality Date    ANKLE SURGERY Left 2020 2021    FOOT SURGERY Left 2020 2021    NASAL SEPTUM SURGERY  2012    SHOULDER SURGERY Right 2018       Family History   Problem Relation Age of Onset    Hypertension Mother     Thyroid disease Mother     Heart disease Father     Hypertension Father     Allergies Father        Social History     Tobacco Use    Smoking status: Never    Smokeless tobacco: Never   Substance Use Topics    Alcohol use: Yes     Comment: on ooccasion    Drug use: Never       Current Outpatient Medications   Medication Sig Dispense Refill    albuterol (VENTOLIN HFA) 90 mcg/actuation inhaler Inhale 2 puffs into the lungs every 6 (six) hours as needed for Wheezing or Shortness of Breath. Rescue inhaler. 18 g 3    dextroamphetamine-amphetamine (ADDERALL) 10 mg Tab Take 1 tablet (10 mg total) by mouth 2 (two) times a day. 60 tablet 0    dupilumab (DUPIXENT SYRINGE) 300 mg/2 mL Syrg  Inject 2 mLs (300 mg total) into the skin every 14 (fourteen) days. 12 mL 3    fluticasone propionate (FLONASE) 50 mcg/actuation nasal spray fluticasone propionate 50 mcg/actuation nasal spray,suspension   USE 2 SPRAYS IN EACH NOSTRIL EVERY DAY AS NEEDED FOR CONGESTION      hydroCHLOROthiazide (HYDRODIURIL) 25 MG tablet Take 25 mg by mouth.      losartan-hydrochlorothiazide 100-25 mg (HYZAAR) 100-25 mg per tablet Take 1 tablet by mouth once daily. 90 tablet 3    triamcinolone (NASACORT) 55 mcg nasal inhaler 2 sprays by Nasal route once daily. 24 g 3    fluticasone-umeclidin-vilanter (TRELEGY ELLIPTA) 100-62.5-25 mcg DsDv Inhale 1 puff into the lungs once daily. (Patient not taking: Reported on 8/8/2023) 60 each 5    testosterone enanthate 100 mg/0.5 mL AtIn Inject 100 mg into the skin every 7 days. 4 each 3     No current facility-administered medications for this visit.       Review of patient's allergies indicates:  No Known Allergies    Physical Exam  Vitals:    08/08/23 1324   BP: (!) 130/90         General: Well-developed, well-nourished in no acute distress  HEENT: Normocephalic, atraumatic, Extraocular movements intact  Neck: supple, trachea midline, no cervical or supraclavicular lymphadenopathy  Respirations: even and unlabored  : 1/2023-circumcised male phallus without lesions, orthotopic urethral meatus, no inguinal hernia, no inguinal lymphadenopathy, no scrotal lesions, testicles descended bilaterally without mass or tenderness  Extremities: atraumatic, moves all equally, no clubbing, cyanosis or edema  Psych: normal affect  Skin: warm and dry, no lesions  Neuro: Alert and oriented, Cranial nerves II-XII grossly intact      PSA  7/31/23: 0.56    Lab Results   Component Value Date    WBC 12.92 (H) 07/31/2023    HGB 15.9 07/31/2023    HCT 48.9 07/31/2023    MCV 96 07/31/2023     07/31/2023       BMP  Lab Results   Component Value Date     07/31/2023    K 3.8 07/31/2023     07/31/2023     CO2 19 (L) 07/31/2023    BUN 17 07/31/2023    CREATININE 1.2 07/31/2023    CALCIUM 9.6 07/31/2023    ANIONGAP 12 07/31/2023    EGFRNORACEVR >60.0 07/31/2023     Lab Results   Component Value Date    ALT 28 07/31/2023    AST 19 07/31/2023    ALKPHOS 85 07/31/2023    BILITOT 0.6 07/31/2023     Testosterone, Total (ng/dL)   Date Value   07/31/2023 370       Assessment:   1. Testosterone deficiency  Testosterone    CBC Without Differential    Comprehensive Metabolic Panel            Plan:  Testosterone deficiency  -     Testosterone; Future; Expected date: 11/08/2023  -     CBC Without Differential; Future; Expected date: 11/08/2023  -     Comprehensive Metabolic Panel; Future; Expected date: 11/08/2023    Other orders  -     testosterone enanthate 100 mg/0.5 mL AtIn; Inject 100 mg into the skin every 7 days.  Dispense: 4 each; Refill: 3          Follow up in about 3 months (around 11/8/2023) for labs before visit.

## 2023-09-15 ENCOUNTER — OFFICE VISIT (OUTPATIENT)
Dept: INTERNAL MEDICINE | Facility: CLINIC | Age: 48
End: 2023-09-15
Payer: COMMERCIAL

## 2023-09-15 ENCOUNTER — LAB VISIT (OUTPATIENT)
Dept: LAB | Facility: HOSPITAL | Age: 48
End: 2023-09-15
Attending: NURSE PRACTITIONER
Payer: COMMERCIAL

## 2023-09-15 VITALS
DIASTOLIC BLOOD PRESSURE: 82 MMHG | WEIGHT: 177.69 LBS | HEART RATE: 76 BPM | RESPIRATION RATE: 15 BRPM | SYSTOLIC BLOOD PRESSURE: 124 MMHG | BODY MASS INDEX: 29.57 KG/M2 | TEMPERATURE: 98 F | OXYGEN SATURATION: 96 %

## 2023-09-15 DIAGNOSIS — J45.50 SEVERE PERSISTENT ASTHMA WITHOUT COMPLICATION: ICD-10-CM

## 2023-09-15 DIAGNOSIS — Z11.3 SCREEN FOR STD (SEXUALLY TRANSMITTED DISEASE): ICD-10-CM

## 2023-09-15 DIAGNOSIS — J32.9 SINUSITIS, UNSPECIFIED CHRONICITY, UNSPECIFIED LOCATION: Primary | ICD-10-CM

## 2023-09-15 LAB
BILIRUB UR QL STRIP: NEGATIVE
CLARITY UR REFRACT.AUTO: CLEAR
COLOR UR AUTO: YELLOW
GLUCOSE UR QL STRIP: NEGATIVE
HAV IGM SERPL QL IA: NORMAL
HBV CORE IGM SERPL QL IA: NORMAL
HBV SURFACE AG SERPL QL IA: NORMAL
HCV AB SERPL QL IA: NORMAL
HGB UR QL STRIP: NEGATIVE
HIV 1+2 AB+HIV1 P24 AG SERPL QL IA: NORMAL
KETONES UR QL STRIP: NEGATIVE
LEUKOCYTE ESTERASE UR QL STRIP: NEGATIVE
NITRITE UR QL STRIP: NEGATIVE
PH UR STRIP: 6 [PH] (ref 5–8)
PROT UR QL STRIP: NEGATIVE
SP GR UR STRIP: 1.03 (ref 1–1.03)
URN SPEC COLLECT METH UR: NORMAL

## 2023-09-15 PROCEDURE — 81003 URINALYSIS AUTO W/O SCOPE: CPT | Performed by: NURSE PRACTITIONER

## 2023-09-15 PROCEDURE — 99999 PR PBB SHADOW E&M-EST. PATIENT-LVL IV: CPT | Mod: PBBFAC,,, | Performed by: NURSE PRACTITIONER

## 2023-09-15 PROCEDURE — 99214 OFFICE O/P EST MOD 30 MIN: CPT | Mod: S$GLB,,, | Performed by: NURSE PRACTITIONER

## 2023-09-15 PROCEDURE — 80074 ACUTE HEPATITIS PANEL: CPT | Performed by: NURSE PRACTITIONER

## 2023-09-15 PROCEDURE — 1160F PR REVIEW ALL MEDS BY PRESCRIBER/CLIN PHARMACIST DOCUMENTED: ICD-10-PCS | Mod: CPTII,S$GLB,, | Performed by: NURSE PRACTITIONER

## 2023-09-15 PROCEDURE — 1159F PR MEDICATION LIST DOCUMENTED IN MEDICAL RECORD: ICD-10-PCS | Mod: CPTII,S$GLB,, | Performed by: NURSE PRACTITIONER

## 2023-09-15 PROCEDURE — 3074F PR MOST RECENT SYSTOLIC BLOOD PRESSURE < 130 MM HG: ICD-10-PCS | Mod: CPTII,S$GLB,, | Performed by: NURSE PRACTITIONER

## 2023-09-15 PROCEDURE — 36415 COLL VENOUS BLD VENIPUNCTURE: CPT | Mod: PO | Performed by: NURSE PRACTITIONER

## 2023-09-15 PROCEDURE — 87491 CHLMYD TRACH DNA AMP PROBE: CPT | Performed by: NURSE PRACTITIONER

## 2023-09-15 PROCEDURE — 3079F PR MOST RECENT DIASTOLIC BLOOD PRESSURE 80-89 MM HG: ICD-10-PCS | Mod: CPTII,S$GLB,, | Performed by: NURSE PRACTITIONER

## 2023-09-15 PROCEDURE — 3008F BODY MASS INDEX DOCD: CPT | Mod: CPTII,S$GLB,, | Performed by: NURSE PRACTITIONER

## 2023-09-15 PROCEDURE — 3008F PR BODY MASS INDEX (BMI) DOCUMENTED: ICD-10-PCS | Mod: CPTII,S$GLB,, | Performed by: NURSE PRACTITIONER

## 2023-09-15 PROCEDURE — 3079F DIAST BP 80-89 MM HG: CPT | Mod: CPTII,S$GLB,, | Performed by: NURSE PRACTITIONER

## 2023-09-15 PROCEDURE — 99214 PR OFFICE/OUTPT VISIT, EST, LEVL IV, 30-39 MIN: ICD-10-PCS | Mod: S$GLB,,, | Performed by: NURSE PRACTITIONER

## 2023-09-15 PROCEDURE — 87389 HIV-1 AG W/HIV-1&-2 AB AG IA: CPT | Performed by: NURSE PRACTITIONER

## 2023-09-15 PROCEDURE — 3074F SYST BP LT 130 MM HG: CPT | Mod: CPTII,S$GLB,, | Performed by: NURSE PRACTITIONER

## 2023-09-15 PROCEDURE — 4010F ACE/ARB THERAPY RXD/TAKEN: CPT | Mod: CPTII,S$GLB,, | Performed by: NURSE PRACTITIONER

## 2023-09-15 PROCEDURE — 4010F PR ACE/ARB THEARPY RXD/TAKEN: ICD-10-PCS | Mod: CPTII,S$GLB,, | Performed by: NURSE PRACTITIONER

## 2023-09-15 PROCEDURE — 1159F MED LIST DOCD IN RCRD: CPT | Mod: CPTII,S$GLB,, | Performed by: NURSE PRACTITIONER

## 2023-09-15 PROCEDURE — 1160F RVW MEDS BY RX/DR IN RCRD: CPT | Mod: CPTII,S$GLB,, | Performed by: NURSE PRACTITIONER

## 2023-09-15 PROCEDURE — 86592 SYPHILIS TEST NON-TREP QUAL: CPT | Performed by: NURSE PRACTITIONER

## 2023-09-15 PROCEDURE — 87591 N.GONORRHOEAE DNA AMP PROB: CPT | Performed by: NURSE PRACTITIONER

## 2023-09-15 PROCEDURE — 99999 PR PBB SHADOW E&M-EST. PATIENT-LVL IV: ICD-10-PCS | Mod: PBBFAC,,, | Performed by: NURSE PRACTITIONER

## 2023-09-15 RX ORDER — AMOXICILLIN AND CLAVULANATE POTASSIUM 875; 125 MG/1; MG/1
1 TABLET, FILM COATED ORAL 2 TIMES DAILY
Qty: 20 TABLET | Refills: 0 | Status: SHIPPED | OUTPATIENT
Start: 2023-09-15 | End: 2023-09-25

## 2023-09-15 RX ORDER — MONTELUKAST SODIUM 10 MG/1
10 TABLET ORAL NIGHTLY
Qty: 90 TABLET | Refills: 0 | Status: SHIPPED | OUTPATIENT
Start: 2023-09-15 | End: 2024-09-14

## 2023-09-15 RX ORDER — PREDNISONE 20 MG/1
40 TABLET ORAL DAILY
Qty: 10 TABLET | Refills: 0 | Status: SHIPPED | OUTPATIENT
Start: 2023-09-15 | End: 2023-09-20

## 2023-09-15 NOTE — PROGRESS NOTES
Subjective:       Patient ID: Fernando Hoff is a 48 y.o. male.    Chief Complaint: Medication Refill (C/o sinus/nasal congestion x 2 weeks as well. Would like adderall, losartan, hctz refills as well )    48 year old here for allergy flare  Started 2 weeks ago when he returned to [facility]  Having chest tightness, chest congestion, some shortness of breath  Some sinus pressure  Using otc remedies   Also with headaches    Wants std testing. Denies dysuria    Medication Refill  Pertinent negatives include no arthralgias, chest pain, chills, diaphoresis, fatigue, fever, headaches, myalgias or rash.     /82 (BP Location: Right arm, Patient Position: Sitting, BP Method: Medium (Manual))   Pulse 76   Temp 97.6 °F (36.4 °C) (Tympanic)   Resp 15   Wt 80.6 kg (177 lb 11.1 oz)   SpO2 96%   BMI 29.57 kg/m²     Review of Systems   Constitutional:  Negative for activity change, appetite change, chills, diaphoresis, fatigue, fever and unexpected weight change.   HENT: Negative.     Eyes: Negative.    Respiratory:  Negative for apnea, chest tightness, shortness of breath and stridor.    Cardiovascular:  Negative for chest pain, palpitations and leg swelling.   Gastrointestinal: Negative.    Endocrine: Negative.    Genitourinary: Negative.    Musculoskeletal:  Negative for arthralgias and myalgias.   Skin:  Negative for color change, pallor, rash and wound.   Allergic/Immunologic: Negative.    Neurological:  Negative for dizziness, facial asymmetry, light-headedness and headaches.   Hematological:  Negative for adenopathy.   Psychiatric/Behavioral:  Negative for agitation and behavioral problems.        Objective:      Physical Exam  Vitals and nursing note reviewed.   Constitutional:       Appearance: He is well-developed.   HENT:      Head: Normocephalic and atraumatic.      Right Ear: Tympanic membrane, ear canal and external ear normal. No decreased hearing noted.      Left Ear: Tympanic membrane, ear canal and  external ear normal. No decreased hearing noted.      Nose: Mucosal edema and rhinorrhea present.      Right Sinus: No maxillary sinus tenderness or frontal sinus tenderness.      Left Sinus: No maxillary sinus tenderness or frontal sinus tenderness.      Mouth/Throat:      Pharynx: Uvula midline. No oropharyngeal exudate or posterior oropharyngeal erythema.      Tonsils: No tonsillar abscesses.   Eyes:      General:         Right eye: No discharge.         Left eye: No discharge.      Conjunctiva/sclera: Conjunctivae normal.   Cardiovascular:      Rate and Rhythm: Normal rate and regular rhythm.      Heart sounds: Normal heart sounds. No murmur heard.  Pulmonary:      Effort: Pulmonary effort is normal. No accessory muscle usage or respiratory distress.      Breath sounds: No decreased breath sounds, wheezing, rhonchi or rales.   Chest:      Chest wall: No tenderness.   Abdominal:      Palpations: Abdomen is soft.      Tenderness: There is no abdominal tenderness.   Musculoskeletal:         General: Normal range of motion.      Cervical back: Normal range of motion.   Skin:     General: Skin is warm and dry.      Findings: No erythema.   Neurological:      Mental Status: He is alert and oriented to person, place, and time.   Psychiatric:         Behavior: Behavior normal.         Assessment:       1. Sinusitis, unspecified chronicity, unspecified location    2. Severe persistent asthma without complication    3. Screen for STD (sexually transmitted disease)        Plan:       Fernando was seen today for medication refill.    Diagnoses and all orders for this visit:    Sinusitis, unspecified chronicity, unspecified location  -     amoxicillin-clavulanate 875-125mg (AUGMENTIN) 875-125 mg per tablet; Take 1 tablet by mouth 2 (two) times daily. for 10 days    Severe persistent asthma without complication  -     montelukast (SINGULAIR) 10 mg tablet; Take 1 tablet (10 mg total) by mouth nightly.  -     predniSONE  (DELTASONE) 20 MG tablet; Take 2 tablets (40 mg total) by mouth once daily. for 5 days    Screen for STD (sexually transmitted disease)  -     C. trachomatis/N. gonorrhoeae by AMP DNA  -     HIV 1/2 Ag/Ab (4th Gen); Future  -     RPR; Future  -     Hepatitis Panel, Acute; Future  -     Urinalysis, Reflex to Urine Culture Urine, Clean Catch      Asthma not controlled  Treatment as above  Continue control inhaler  Std testing per request  Follow up with Primary Care Physician for continued care, if not improving, worsening symptoms

## 2023-09-16 LAB — RPR SER QL: NORMAL

## 2023-09-17 LAB
C TRACH DNA SPEC QL NAA+PROBE: NOT DETECTED
N GONORRHOEA DNA SPEC QL NAA+PROBE: NOT DETECTED

## 2023-09-18 DIAGNOSIS — F90.9 ATTENTION DEFICIT HYPERACTIVITY DISORDER (ADHD), UNSPECIFIED ADHD TYPE: ICD-10-CM

## 2023-09-18 NOTE — TELEPHONE ENCOUNTER
No care due was identified.  [facility] Embedded Care Due Messages. Reference number: [id number].   9/18/2023 5:38:18 PM CDT

## 2023-09-18 NOTE — TELEPHONE ENCOUNTER
----- Message from Imani Keith sent at 9/18/2023  1:52 PM CDT -----  Contact: zechariah Haro is calling in regards to the status on (ADDERALL)please call back at [phone]        [id number] - [place] - [address] AT [facility] [address]  [address] [place] [address]  Phone: [phone] Fax: [phone]  Hours: Not open 24 hours    Thanks  KAYLA

## 2023-09-19 RX ORDER — DEXTROAMPHETAMINE SACCHARATE, AMPHETAMINE ASPARTATE, DEXTROAMPHETAMINE SULFATE AND AMPHETAMINE SULFATE 2.5; 2.5; 2.5; 2.5 MG/1; MG/1; MG/1; MG/1
1 TABLET ORAL 2 TIMES DAILY
Qty: 60 TABLET | Refills: 0 | OUTPATIENT
Start: 2023-09-19

## 2023-10-04 ENCOUNTER — TELEPHONE (OUTPATIENT)
Dept: PULMONOLOGY | Facility: CLINIC | Age: 48
End: 2023-10-04

## 2023-10-04 DIAGNOSIS — J30.89 ENVIRONMENTAL AND SEASONAL ALLERGIES: ICD-10-CM

## 2023-10-04 DIAGNOSIS — J45.50 SEVERE PERSISTENT ASTHMA WITHOUT COMPLICATION: ICD-10-CM

## 2023-10-04 DIAGNOSIS — R76.8 ELEVATED IGE LEVEL: ICD-10-CM

## 2023-10-04 RX ORDER — DUPILUMAB 300 MG/2ML
300 INJECTION, SOLUTION SUBCUTANEOUS
Qty: 12 ML | Refills: 3 | Status: ACTIVE | OUTPATIENT
Start: 2023-10-04

## 2023-10-04 NOTE — TELEPHONE ENCOUNTER
----- Message from Kendy Matthews MA sent at 10/4/2023  1:38 PM CDT -----  Regarding: FW: RX Change  Contact: Arslan  Would you like me to call pt and see if it can be sent elsewhere?    ----- Message -----  From: Annel Thomason MA  Sent: 10/4/2023  12:44 PM CDT  To: Lejeune Elizabeth Staff  Subject: FW: RX Change                                      ----- Message -----  From: Brielle Wynn  Sent: 10/4/2023  11:49 AM CDT  To: Terry Powell  Subject: RX Change                                        .Type:  RX Refill Request    Who Called: Arslan   Refill or New Rx:   RX Name and Strength: dupilumab (DUPIXENT SYRINGE) 300 mg/2 mL Syrg  How is the patient currently taking it? (ex. 1XDay):  Is this a 30 day or 90 day RX:  Preferred Pharmacy with phone number:  Local or Mail Order:  Ordering Provider: ARCENIO Stewart   Would the patient rather a call back or a response via [facility]? Call   Best Call Back Number: [phone] (home)    Additional Information:  Arslan from [facility] called and says they are out of network and suggest cancelling this refill. Please contact the patient.

## 2023-11-14 ENCOUNTER — OFFICE VISIT (OUTPATIENT)
Dept: UROLOGY | Facility: CLINIC | Age: 48
End: 2023-11-14
Payer: COMMERCIAL

## 2023-11-14 VITALS
RESPIRATION RATE: 18 BRPM | WEIGHT: 183.13 LBS | SYSTOLIC BLOOD PRESSURE: 129 MMHG | HEIGHT: 65 IN | HEART RATE: 64 BPM | BODY MASS INDEX: 30.51 KG/M2 | TEMPERATURE: 98 F | DIASTOLIC BLOOD PRESSURE: 82 MMHG

## 2023-11-14 DIAGNOSIS — E34.9 TESTOSTERONE DEFICIENCY: Primary | ICD-10-CM

## 2023-11-14 PROCEDURE — 99213 OFFICE O/P EST LOW 20 MIN: CPT | Mod: S$GLB,,, | Performed by: UROLOGY

## 2023-11-14 PROCEDURE — 3079F PR MOST RECENT DIASTOLIC BLOOD PRESSURE 80-89 MM HG: ICD-10-PCS | Mod: CPTII,S$GLB,, | Performed by: UROLOGY

## 2023-11-14 PROCEDURE — 99999 PR PBB SHADOW E&M-EST. PATIENT-LVL III: CPT | Mod: PBBFAC,,, | Performed by: UROLOGY

## 2023-11-14 PROCEDURE — 4010F ACE/ARB THERAPY RXD/TAKEN: CPT | Mod: CPTII,S$GLB,, | Performed by: UROLOGY

## 2023-11-14 PROCEDURE — 1159F PR MEDICATION LIST DOCUMENTED IN MEDICAL RECORD: ICD-10-PCS | Mod: CPTII,S$GLB,, | Performed by: UROLOGY

## 2023-11-14 PROCEDURE — 1159F MED LIST DOCD IN RCRD: CPT | Mod: CPTII,S$GLB,, | Performed by: UROLOGY

## 2023-11-14 PROCEDURE — 3079F DIAST BP 80-89 MM HG: CPT | Mod: CPTII,S$GLB,, | Performed by: UROLOGY

## 2023-11-14 PROCEDURE — 4010F PR ACE/ARB THEARPY RXD/TAKEN: ICD-10-PCS | Mod: CPTII,S$GLB,, | Performed by: UROLOGY

## 2023-11-14 PROCEDURE — 3074F PR MOST RECENT SYSTOLIC BLOOD PRESSURE < 130 MM HG: ICD-10-PCS | Mod: CPTII,S$GLB,, | Performed by: UROLOGY

## 2023-11-14 PROCEDURE — 3074F SYST BP LT 130 MM HG: CPT | Mod: CPTII,S$GLB,, | Performed by: UROLOGY

## 2023-11-14 PROCEDURE — 3008F BODY MASS INDEX DOCD: CPT | Mod: CPTII,S$GLB,, | Performed by: UROLOGY

## 2023-11-14 PROCEDURE — 3008F PR BODY MASS INDEX (BMI) DOCUMENTED: ICD-10-PCS | Mod: CPTII,S$GLB,, | Performed by: UROLOGY

## 2023-11-14 PROCEDURE — 99213 PR OFFICE/OUTPT VISIT, EST, LEVL III, 20-29 MIN: ICD-10-PCS | Mod: S$GLB,,, | Performed by: UROLOGY

## 2023-11-14 PROCEDURE — 99999 PR PBB SHADOW E&M-EST. PATIENT-LVL III: ICD-10-PCS | Mod: PBBFAC,,, | Performed by: UROLOGY

## 2023-11-14 NOTE — PROGRESS NOTES
CC: follow up Testosterone    History of Present Illness:   Fernando Hoff is a 48 y.o. male here for follow up.     11/14/23-He was using xyosted 100mg weekly, but switched jobs and insurance and has been off for a month. No bothersome LUTS. Was doing a lot better on the 100mg dosage. It improved his energy. May be transferring jobs again.   8/8/23-Xyosted works okay. Energy is fair. Libido is okay. No bothersome LUTS.    1/31/23-47yo male, here for evaluation of low T. He reports that he has been Xyosted 75mg weekly for about 2 years. He recently moved from [place]. Prior to that, was on IM testosterone and he had too many peaks and valleys in his levels. Prior to testosterone replacement, he had low energy and low energy. Xyosted has seemed to improve symptoms. No bothersome LUTS.       Review of Systems   Constitutional:  Negative for chills and fever.   Respiratory:  Negative for shortness of breath.    Cardiovascular:  Negative for chest pain and palpitations.   Gastrointestinal:  Negative for abdominal pain.   Genitourinary:  Negative for difficulty urinating.   All other systems reviewed and are negative.        Past Medical History:   Diagnosis Date    Hypertension     Low testosterone        Past Surgical History:   Procedure Laterality Date    ANKLE SURGERY Left 2020 2021    FOOT SURGERY Left 2020 2021    NASAL SEPTUM SURGERY  2012    SHOULDER SURGERY Right 2018       Family History   Problem Relation Age of Onset    Hypertension Mother     Thyroid disease Mother     Heart disease Father     Hypertension Father     Allergies Father        Social History     Tobacco Use    Smoking status: Never    Smokeless tobacco: Never   Substance Use Topics    Alcohol use: Yes     Comment: on ooccasion    Drug use: Never       Current Outpatient Medications   Medication Sig Dispense Refill    albuterol (VENTOLIN HFA) 90 mcg/actuation inhaler Inhale 2 puffs into the lungs every 6 (six) hours as needed for  Wheezing or Shortness of Breath. Rescue inhaler. 18 g 3    dextroamphetamine-amphetamine (ADDERALL) 10 mg Tab Take 1 tablet (10 mg total) by mouth 2 (two) times a day. 60 tablet 0    dupilumab (DUPIXENT SYRINGE) 300 mg/2 mL Syrg Inject 2 mLs (300 mg total) into the skin every 14 (fourteen) days. 12 mL 3    fluticasone propionate (FLONASE) 50 mcg/actuation nasal spray fluticasone propionate 50 mcg/actuation nasal spray,suspension   USE 2 SPRAYS IN EACH NOSTRIL EVERY DAY AS NEEDED FOR CONGESTION      fluticasone-umeclidin-vilanter (TRELEGY ELLIPTA) 100-62.5-25 mcg DsDv Inhale 1 puff into the lungs once daily. 60 each 5    hydroCHLOROthiazide (HYDRODIURIL) 25 MG tablet Take 25 mg by mouth.      losartan-hydrochlorothiazide 100-25 mg (HYZAAR) 100-25 mg per tablet Take 1 tablet by mouth once daily. 90 tablet 3    montelukast (SINGULAIR) 10 mg tablet Take 1 tablet (10 mg total) by mouth nightly. 90 tablet 0    triamcinolone (NASACORT) 55 mcg nasal inhaler 2 sprays by Nasal route once daily. 24 g 3    testosterone enanthate 100 mg/0.5 mL AtIn Inject 100 mg into the skin every 7 days. 4 each 3     No current facility-administered medications for this visit.       Review of patient's allergies indicates:  No Known Allergies    Physical Exam  Vitals:    11/14/23 1438   BP: 129/82   Pulse: 64   Resp: 18   Temp: 98 °F (36.7 °C)         General: Well-developed, well-nourished in no acute distress  HEENT: Normocephalic, atraumatic, Extraocular movements intact  Neck: supple, trachea midline, no cervical or supraclavicular lymphadenopathy  Respirations: even and unlabored  : 1/2023-circumcised male phallus without lesions, orthotopic urethral meatus, no inguinal hernia, no inguinal lymphadenopathy, no scrotal lesions, testicles descended bilaterally without mass or tenderness  Extremities: atraumatic, moves all equally, no clubbing, cyanosis or edema  Psych: normal affect  Skin: warm and dry, no lesions  Neuro: Alert and  oriented, Cranial nerves II-XII grossly intact      PSA  7/31/23: 0.56    Lab Results   Component Value Date    WBC 12.92 (H) 07/31/2023    HGB 15.9 07/31/2023    HCT 48.9 07/31/2023    MCV 96 07/31/2023     07/31/2023       BMP  Lab Results   Component Value Date     07/31/2023    K 3.8 07/31/2023     07/31/2023    CO2 19 (L) 07/31/2023    BUN 17 07/31/2023    CREATININE 1.2 07/31/2023    CALCIUM 9.6 07/31/2023    ANIONGAP 12 07/31/2023    EGFRNORACEVR >60.0 07/31/2023     Lab Results   Component Value Date    ALT 28 07/31/2023    AST 19 07/31/2023    ALKPHOS 85 07/31/2023    BILITOT 0.6 07/31/2023     Testosterone, Total (ng/dL)   Date Value   07/31/2023 370       Assessment:   1. Testosterone deficiency                Plan:  Testosterone deficiency    Other orders  -     testosterone enanthate 100 mg/0.5 mL AtIn; Inject 100 mg into the skin every 7 days.  Dispense: 4 each; Refill: 3            Follow up in about 3 months (around 2/14/2024) for labs before visit.

## 2023-11-19 DIAGNOSIS — J45.41 MODERATE PERSISTENT ASTHMA WITH ACUTE EXACERBATION: ICD-10-CM

## 2023-11-19 RX ORDER — FLUTICASONE FUROATE, UMECLIDINIUM BROMIDE AND VILANTEROL TRIFENATATE 100; 62.5; 25 UG/1; UG/1; UG/1
1 POWDER RESPIRATORY (INHALATION)
Qty: 180 EACH | Refills: 1 | Status: SHIPPED | OUTPATIENT
Start: 2023-11-19

## 2023-11-19 NOTE — TELEPHONE ENCOUNTER
No care due was identified.  [facility] Embedded Care Due Messages. Reference number: [id number].   11/19/2023 5:19:01 PM CST

## 2023-11-20 NOTE — TELEPHONE ENCOUNTER
Fernando Hoff  is requesting a refill authorization.  Brief Assessment and Rationale for Refill:  Approve     Medication Therapy Plan:         Comments:     Note composed:7:14 PM 11/19/2023

## 2024-09-25 ENCOUNTER — PATIENT MESSAGE (OUTPATIENT)
Dept: PRIMARY CARE CLINIC | Facility: CLINIC | Age: 49
End: 2024-09-25

## 2024-10-23 ENCOUNTER — ERX REFILL RESPONSE (OUTPATIENT)
Dept: URBAN - METROPOLITAN AREA CLINIC 113 | Facility: CLINIC | Age: 49
End: 2024-10-23

## 2024-10-23 ENCOUNTER — LAB OUTSIDE AN ENCOUNTER (OUTPATIENT)
Dept: URBAN - METROPOLITAN AREA CLINIC 113 | Facility: CLINIC | Age: 49
End: 2024-10-23

## 2024-10-23 ENCOUNTER — OFFICE VISIT (OUTPATIENT)
Dept: URBAN - METROPOLITAN AREA CLINIC 113 | Facility: CLINIC | Age: 49
End: 2024-10-23
Payer: COMMERCIAL

## 2024-10-23 VITALS
TEMPERATURE: 97.7 F | BODY MASS INDEX: 30.35 KG/M2 | HEIGHT: 65 IN | DIASTOLIC BLOOD PRESSURE: 84 MMHG | HEART RATE: 79 BPM | RESPIRATION RATE: 14 BRPM | WEIGHT: 182.2 LBS | SYSTOLIC BLOOD PRESSURE: 112 MMHG

## 2024-10-23 DIAGNOSIS — K21.9 GERD WITHOUT ESOPHAGITIS: ICD-10-CM

## 2024-10-23 DIAGNOSIS — Z12.11 COLON CANCER SCREENING: ICD-10-CM

## 2024-10-23 PROBLEM — 266435005: Status: ACTIVE | Noted: 2024-10-23

## 2024-10-23 PROCEDURE — 99204 OFFICE O/P NEW MOD 45 MIN: CPT | Performed by: NURSE PRACTITIONER

## 2024-10-23 RX ORDER — SEMAGLUTIDE 1.7 MG/.75ML
0.75 ML INJECTION, SOLUTION SUBCUTANEOUS
Status: ACTIVE | COMMUNITY

## 2024-10-23 RX ORDER — OMEPRAZOLE 40 MG/1
1 CAPSULE CAPSULE, DELAYED RELEASE ORAL ONCE A DAY
Qty: 30 CAPSULE | Refills: 5 | OUTPATIENT

## 2024-10-23 RX ORDER — LOSARTAN POTASSIUM AND HYDROCHLOROTHIAZIDE 100; 12.5 MG/1; MG/1
1 TABLET TABLET, FILM COATED ORAL ONCE A DAY
Status: ACTIVE | COMMUNITY

## 2024-10-23 RX ORDER — OMEPRAZOLE 40 MG/1
1 CAPSULE CAPSULE, DELAYED RELEASE ORAL ONCE A DAY
Qty: 30 CAPSULE | Refills: 5 | OUTPATIENT
Start: 2024-10-23

## 2024-10-23 RX ORDER — FAMOTIDINE 40 MG/1
1 TABLET TABLET, FILM COATED ORAL
Qty: 30 | Refills: 5 | OUTPATIENT

## 2024-10-23 RX ORDER — TESTOSTERONE ENANTHATE 100 MG/.5ML
AS DIRECTED INJECTION SUBCUTANEOUS
Status: ACTIVE | COMMUNITY

## 2024-10-23 RX ORDER — FAMOTIDINE 40 MG/1
1 TABLET TABLET, FILM COATED ORAL
Qty: 30 | Refills: 5 | OUTPATIENT
Start: 2024-10-23

## 2024-10-23 RX ORDER — FAMOTIDINE 40 MG/1
TAKE 1 TABLET BY MOUTH DAILY AT BEDTIME TABLET, FILM COATED ORAL
Qty: 90 TABLET | Refills: 1 | OUTPATIENT

## 2024-10-23 RX ORDER — OMEPRAZOLE 40 MG/1
TAKE 1 CAPSULE BY MOUTH DAILY CAPSULE, DELAYED RELEASE ORAL
Qty: 90 CAPSULE | Refills: 1 | OUTPATIENT

## 2024-10-23 NOTE — HPI-TODAY'S VISIT:
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  He was last seen in the office 2/16/2012 for abdominal pain, red blood per rectum, and constipation.  He had undergone a colonoscopy in January 2009 to evaluate diarrhea and hematochezia notable for internal hemorrhoids and unremarkable random biopsies.  It was discussed that his symptoms were likely associated with hemorrhoidal bleeding due to constipation.  He was to continue MiraLAX and Benefiber and scheduled for a flexible sigmoidoscopy which he canceled.  He has not had a colonoscopy since 2009.  He has a longstanding history of intermittent acid reflux describing heartburn and regurgitation.  This is been diet dependent associated with an increase in caffeine, ingesting fatty foods.  He has required antacids once or twice a week or not at all for 1 to 2 weeks.  He occasionally relieved his symptoms with drinking water.  He has experienced an increase in symptoms that have partially improved on omeprazole 20 mg daily since he started Wegovy for weight loss.  He reports frequent heartburn and regurgitation throughout the day and occasional nocturnal symptoms.  He denies dysphagia or abdominal pain.  On omeprazole 20 mg daily, he has persistent symptoms 3-4 times per week.  He has occasional nausea.  He denies vomiting.  He typically has regular bowel movements.  He currently has occasional constipation that he attributes to decreased water intake or Wegovy side effect.  He denies any other abdominal symptoms. He denies a family history of esophageal, gastric, or colon cancer.  **currently has duplicate chart acct #[id number]; action to combine sent

## 2024-10-26 ENCOUNTER — DASHBOARD ENCOUNTERS (OUTPATIENT)
Age: 49
End: 2024-10-26

## 2024-12-12 ENCOUNTER — OFFICE VISIT (OUTPATIENT)
Dept: URBAN - METROPOLITAN AREA MEDICAL CENTER 2 | Facility: MEDICAL CENTER | Age: 49
End: 2024-12-12

## 2024-12-12 RX ORDER — TESTOSTERONE ENANTHATE 100 MG/.5ML
AS DIRECTED INJECTION SUBCUTANEOUS
Status: ACTIVE | COMMUNITY

## 2024-12-12 RX ORDER — LOSARTAN POTASSIUM AND HYDROCHLOROTHIAZIDE 100; 12.5 MG/1; MG/1
1 TABLET TABLET, FILM COATED ORAL ONCE A DAY
Status: ACTIVE | COMMUNITY

## 2024-12-12 RX ORDER — OMEPRAZOLE 40 MG/1
TAKE 1 CAPSULE BY MOUTH DAILY CAPSULE, DELAYED RELEASE ORAL
Qty: 90 CAPSULE | Refills: 1 | Status: ACTIVE | COMMUNITY

## 2024-12-12 RX ORDER — FAMOTIDINE 40 MG/1
TAKE 1 TABLET BY MOUTH DAILY AT BEDTIME TABLET, FILM COATED ORAL
Qty: 90 TABLET | Refills: 1 | Status: ACTIVE | COMMUNITY

## 2024-12-12 RX ORDER — SEMAGLUTIDE 1.7 MG/.75ML
0.75 ML INJECTION, SOLUTION SUBCUTANEOUS
Status: ACTIVE | COMMUNITY

## 2025-01-06 ENCOUNTER — OFFICE VISIT (OUTPATIENT)
Dept: URBAN - METROPOLITAN AREA CLINIC 113 | Facility: CLINIC | Age: 50
End: 2025-01-06

## 2025-01-06 ENCOUNTER — OFFICE VISIT (OUTPATIENT)
Dept: URBAN - METROPOLITAN AREA CLINIC 113 | Facility: CLINIC | Age: 50
End: 2025-01-06
Payer: COMMERCIAL

## 2025-01-06 VITALS
RESPIRATION RATE: 16 BRPM | DIASTOLIC BLOOD PRESSURE: 67 MMHG | HEIGHT: 65 IN | HEART RATE: 87 BPM | SYSTOLIC BLOOD PRESSURE: 122 MMHG | WEIGHT: 175.2 LBS | TEMPERATURE: 99 F | BODY MASS INDEX: 29.19 KG/M2

## 2025-01-06 DIAGNOSIS — K21.9 GERD WITHOUT ESOPHAGITIS: ICD-10-CM

## 2025-01-06 DIAGNOSIS — Z86.0101 HISTORY OF ADENOMATOUS POLYP OF COLON: ICD-10-CM

## 2025-01-06 DIAGNOSIS — R11.2 NAUSEA AND VOMITING, UNSPECIFIED VOMITING TYPE: ICD-10-CM

## 2025-01-06 DIAGNOSIS — K57.30 COLON, DIVERTICULOSIS: ICD-10-CM

## 2025-01-06 PROBLEM — 429047008: Status: ACTIVE | Noted: 2025-01-06

## 2025-01-06 PROBLEM — 16932000: Status: ACTIVE | Noted: 2025-01-06

## 2025-01-06 PROBLEM — 733657002: Status: ACTIVE | Noted: 2025-01-06

## 2025-01-06 PROCEDURE — 99213 OFFICE O/P EST LOW 20 MIN: CPT | Performed by: NURSE PRACTITIONER

## 2025-01-06 RX ORDER — TESTOSTERONE ENANTHATE 100 MG/.5ML
AS DIRECTED INJECTION SUBCUTANEOUS
Status: ACTIVE | COMMUNITY

## 2025-01-06 RX ORDER — LOSARTAN POTASSIUM AND HYDROCHLOROTHIAZIDE 100; 12.5 MG/1; MG/1
1 TABLET TABLET, FILM COATED ORAL ONCE A DAY
Status: ACTIVE | COMMUNITY

## 2025-01-06 RX ORDER — SEMAGLUTIDE 1.7 MG/.75ML
0.75 ML INJECTION, SOLUTION SUBCUTANEOUS
Status: ACTIVE | COMMUNITY

## 2025-01-06 RX ORDER — OMEPRAZOLE 40 MG/1
TAKE 1 CAPSULE BY MOUTH DAILY CAPSULE, DELAYED RELEASE ORAL
Qty: 90 CAPSULE | Refills: 1 | Status: ACTIVE | COMMUNITY

## 2025-01-06 RX ORDER — FAMOTIDINE 40 MG/1
TAKE 1 TABLET BY MOUTH DAILY AT BEDTIME TABLET, FILM COATED ORAL
Qty: 90 TABLET | Refills: 1 | Status: ACTIVE | COMMUNITY

## 2025-01-06 NOTE — HPI-TODAY'S VISIT:
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  He was last seen 10/23/2024.  Had a longstanding history of acid reflux.  Symptoms had occurred occasionally and were relieved with drinking water or taking antacids.  He reported worsening symptoms since starting Wegovy.  He understood this was likely a side effect.  He had started omeprazole 20 mg daily and had persistent symptoms 3-4 times a week including nocturnal symptoms.  EGD was recommended to screen for Peace's.  He was to continue Wegovy temporarily until he reached his weight loss goal.  In the interim, omeprazole was increased to 40 mg and famotidine was added at bedtime.  He was average risk for colon cancer.  He had an unremarkable colonoscopy in 2005 to evaluate rectal bleeding.  Colonoscopy was recommended for screening.  Colonoscopy 12/12/2024:BBPS 9, removal of 3 sessile 5 to 8 mm transverse sessile serrated adenomas, sigmoid diverticulosis, grade 1 nonbleeding internal hemorrhoids.  Surveillance recommended in 3 to 5 years.  EGD 12/12/2024:Irregular Z-line otherwise normal esophagus, small hiatal hernia, nonerosive gastropathy status post biopsy, normal examined duodenum.  Pathology: Endoscopic biopsy of gastric erythema notable for mild chronic inflammation of antral/oxyntic mucosa negative for H. pylori.  He remained off Wegovy during the holidays.  After his first dose, he had frequent nausea and vomiting.  He felt overly full the following day.  In the evening, he ate a sandwich and drank milk and had nausea and vomiting intermittently throughout the night.  He is not using further doses of Wegovy.  He continues to take omeprazole 40 mg daily and famotidine at bedtime.  He denies acid reflux symptoms.  He is having regular bowel movements.  He denies other abdominal symptoms.

## 2025-01-06 NOTE — HPI-OTHER HISTORIES
Colonoscopy 12/12/2024:BBPS 9, removal of 3 sessile 5 to 8 mm transverse sessile serrated adenomas, sigmoid diverticulosis, grade 1 nonbleeding internal hemorrhoids. Surveillance recommended in 3 to 5 years.  EGD 12/12/2024:Irregular Z-line otherwise normal esophagus, small hiatal hernia, nonerosive gastropathy status post biopsy, normal examined duodenum. Pathology: Endoscopic biopsy of gastric erythema notable for mild chronic inflammation of antral/oxyntic mucosa negative for H. pylori.

## 2025-01-06 NOTE — HPI-TODAY'S VISIT:
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 He was last seen 10/23/2024.  Had a longstanding history of acid reflux.  Symptoms had occurred occasionally and were relieved with drinking water or taking antacids.  He reported worsening symptoms since starting Wegovy.  He understood this was likely a side effect.  He had started omeprazole 20 mg daily and had persistent symptoms 3-4 times a week including nocturnal symptoms.  EGD was recommended to screen for Peace's.  He was to continue Wegovy temporarily until he reached his weight loss goal.  In the interim, omeprazole was increased to 40 mg and famotidine was added at bedtime.  He was average risk for colon cancer.  He had an unremarkable colonoscopy in 2005 to evaluate rectal bleeding.  Colonoscopy was recommended for screening. Colonoscopy 12/12/2024:BBPS 9, removal of 3 sessile 5 to 8 mm transverse sessile serrated adenomas, sigmoid diverticulosis, grade 1 nonbleeding internal hemorrhoids.  Surveillance recommended in 3 to 5 years. EGD 12/12/2024:Irregular Z-line otherwise normal esophagus, small hiatal hernia, nonerosive gastropathy status post biopsy, normal examined duodenum.  Pathology: Endoscopic biopsy of gastric erythema notable for mild chronic inflammation of antral/oxyntic mucosa negative for H. pylori.

## 2025-02-23 ENCOUNTER — TELEPHONE ENCOUNTER (OUTPATIENT)
Dept: URBAN - METROPOLITAN AREA CLINIC 113 | Facility: CLINIC | Age: 50
End: 2025-02-23

## 2025-07-17 ENCOUNTER — TELEPHONE ENCOUNTER (OUTPATIENT)
Dept: URBAN - METROPOLITAN AREA CLINIC 113 | Facility: CLINIC | Age: 50
End: 2025-07-17

## 2025-07-17 RX ORDER — DICYCLOMINE HYDROCHLORIDE 10 MG/1
1 TABLET CAPSULE ORAL
Qty: 60 | Refills: 3 | OUTPATIENT
Start: 2025-07-17

## 2025-07-17 RX ORDER — AMOXICILLIN AND CLAVULANATE POTASSIUM 875; 125 MG/1; MG/1
1 TABLET TABLET, FILM COATED ORAL
Qty: 10 TABLET | Refills: 0 | OUTPATIENT
Start: 2025-07-17 | End: 2025-07-22